# Patient Record
Sex: FEMALE | Race: OTHER | NOT HISPANIC OR LATINO | Employment: FULL TIME | URBAN - METROPOLITAN AREA
[De-identification: names, ages, dates, MRNs, and addresses within clinical notes are randomized per-mention and may not be internally consistent; named-entity substitution may affect disease eponyms.]

---

## 2017-09-14 ENCOUNTER — HOSPITAL ENCOUNTER (OUTPATIENT)
Dept: RADIOLOGY | Facility: MEDICAL CENTER | Age: 52
End: 2017-09-14

## 2017-09-19 ENCOUNTER — HOSPITAL ENCOUNTER (OUTPATIENT)
Dept: RADIOLOGY | Facility: MEDICAL CENTER | Age: 52
End: 2017-09-19
Attending: INTERNAL MEDICINE
Payer: COMMERCIAL

## 2017-09-19 DIAGNOSIS — Z12.31 VISIT FOR SCREENING MAMMOGRAM: ICD-10-CM

## 2017-09-19 PROCEDURE — G0202 SCR MAMMO BI INCL CAD: HCPCS

## 2017-09-20 ENCOUNTER — HOSPITAL ENCOUNTER (OUTPATIENT)
Facility: MEDICAL CENTER | Age: 52
End: 2017-09-20
Attending: INTERNAL MEDICINE
Payer: COMMERCIAL

## 2017-09-20 ENCOUNTER — OFFICE VISIT (OUTPATIENT)
Dept: INTERNAL MEDICINE | Facility: MEDICAL CENTER | Age: 52
End: 2017-09-20
Payer: COMMERCIAL

## 2017-09-20 ENCOUNTER — HOSPITAL ENCOUNTER (OUTPATIENT)
Dept: LAB | Facility: MEDICAL CENTER | Age: 52
End: 2017-09-20
Attending: INTERNAL MEDICINE
Payer: COMMERCIAL

## 2017-09-20 VITALS
SYSTOLIC BLOOD PRESSURE: 120 MMHG | HEIGHT: 58 IN | DIASTOLIC BLOOD PRESSURE: 76 MMHG | TEMPERATURE: 98.5 F | WEIGHT: 138 LBS | OXYGEN SATURATION: 97 % | BODY MASS INDEX: 28.97 KG/M2 | HEART RATE: 62 BPM

## 2017-09-20 DIAGNOSIS — E78.5 DYSLIPIDEMIA: ICD-10-CM

## 2017-09-20 DIAGNOSIS — L60.3 CRACKED NAILS: ICD-10-CM

## 2017-09-20 DIAGNOSIS — R42 VERTIGO: ICD-10-CM

## 2017-09-20 DIAGNOSIS — E55.9 VITAMIN D DEFICIENCY: ICD-10-CM

## 2017-09-20 DIAGNOSIS — Z12.11 SCREEN FOR COLON CANCER: ICD-10-CM

## 2017-09-20 DIAGNOSIS — Z12.4 PAP SMEAR FOR CERVICAL CANCER SCREENING: ICD-10-CM

## 2017-09-20 LAB
25(OH)D3 SERPL-MCNC: 46 NG/ML (ref 30–100)
ALBUMIN SERPL BCP-MCNC: 4 G/DL (ref 3.2–4.9)
ALBUMIN/GLOB SERPL: 1.3 G/DL
ALP SERPL-CCNC: 112 U/L (ref 30–99)
ALT SERPL-CCNC: 32 U/L (ref 2–50)
ANION GAP SERPL CALC-SCNC: 9 MMOL/L (ref 0–11.9)
AST SERPL-CCNC: 29 U/L (ref 12–45)
BASOPHILS # BLD AUTO: 0.6 % (ref 0–1.8)
BASOPHILS # BLD: 0.03 K/UL (ref 0–0.12)
BILIRUB SERPL-MCNC: 0.5 MG/DL (ref 0.1–1.5)
BUN SERPL-MCNC: 14 MG/DL (ref 8–22)
CALCIUM SERPL-MCNC: 9.4 MG/DL (ref 8.5–10.5)
CHLORIDE SERPL-SCNC: 104 MMOL/L (ref 96–112)
CHOLEST SERPL-MCNC: 262 MG/DL (ref 100–199)
CO2 SERPL-SCNC: 26 MMOL/L (ref 20–33)
CREAT SERPL-MCNC: 0.68 MG/DL (ref 0.5–1.4)
CYTOLOGY REG CYTOL: NORMAL
EOSINOPHIL # BLD AUTO: 0.09 K/UL (ref 0–0.51)
EOSINOPHIL NFR BLD: 1.7 % (ref 0–6.9)
ERYTHROCYTE [DISTWIDTH] IN BLOOD BY AUTOMATED COUNT: 42.3 FL (ref 35.9–50)
FERRITIN SERPL-MCNC: 28.1 NG/ML (ref 10–291)
GFR SERPL CREATININE-BSD FRML MDRD: >60 ML/MIN/1.73 M 2
GLOBULIN SER CALC-MCNC: 3 G/DL (ref 1.9–3.5)
GLUCOSE SERPL-MCNC: 99 MG/DL (ref 65–99)
HCT VFR BLD AUTO: 41.6 % (ref 37–47)
HDLC SERPL-MCNC: 74 MG/DL
HGB BLD-MCNC: 14 G/DL (ref 12–16)
IMM GRANULOCYTES # BLD AUTO: 0.01 K/UL (ref 0–0.11)
IMM GRANULOCYTES NFR BLD AUTO: 0.2 % (ref 0–0.9)
LDLC SERPL CALC-MCNC: 168 MG/DL
LYMPHOCYTES # BLD AUTO: 2.12 K/UL (ref 1–4.8)
LYMPHOCYTES NFR BLD: 39.2 % (ref 22–41)
MCH RBC QN AUTO: 31.3 PG (ref 27–33)
MCHC RBC AUTO-ENTMCNC: 33.7 G/DL (ref 33.6–35)
MCV RBC AUTO: 92.9 FL (ref 81.4–97.8)
MONOCYTES # BLD AUTO: 0.29 K/UL (ref 0–0.85)
MONOCYTES NFR BLD AUTO: 5.4 % (ref 0–13.4)
NEUTROPHILS # BLD AUTO: 2.87 K/UL (ref 2–7.15)
NEUTROPHILS NFR BLD: 52.9 % (ref 44–72)
NRBC # BLD AUTO: 0 K/UL
NRBC BLD AUTO-RTO: 0 /100 WBC
PLATELET # BLD AUTO: 254 K/UL (ref 164–446)
PMV BLD AUTO: 11.5 FL (ref 9–12.9)
POTASSIUM SERPL-SCNC: 4.5 MMOL/L (ref 3.6–5.5)
PROT SERPL-MCNC: 7 G/DL (ref 6–8.2)
RBC # BLD AUTO: 4.48 M/UL (ref 4.2–5.4)
SODIUM SERPL-SCNC: 139 MMOL/L (ref 135–145)
TRIGL SERPL-MCNC: 100 MG/DL (ref 0–149)
WBC # BLD AUTO: 5.4 K/UL (ref 4.8–10.8)

## 2017-09-20 PROCEDURE — 85025 COMPLETE CBC W/AUTO DIFF WBC: CPT

## 2017-09-20 PROCEDURE — 36415 COLL VENOUS BLD VENIPUNCTURE: CPT

## 2017-09-20 PROCEDURE — 80061 LIPID PANEL: CPT

## 2017-09-20 PROCEDURE — 82306 VITAMIN D 25 HYDROXY: CPT

## 2017-09-20 PROCEDURE — 80053 COMPREHEN METABOLIC PANEL: CPT

## 2017-09-20 PROCEDURE — 88175 CYTOPATH C/V AUTO FLUID REDO: CPT

## 2017-09-20 PROCEDURE — 82274 ASSAY TEST FOR BLOOD FECAL: CPT

## 2017-09-20 PROCEDURE — 99214 OFFICE O/P EST MOD 30 MIN: CPT | Mod: 25 | Performed by: INTERNAL MEDICINE

## 2017-09-20 PROCEDURE — 82728 ASSAY OF FERRITIN: CPT

## 2017-09-20 ASSESSMENT — PATIENT HEALTH QUESTIONNAIRE - PHQ9: CLINICAL INTERPRETATION OF PHQ2 SCORE: 0

## 2017-09-20 NOTE — PROGRESS NOTES
SUBJECTIVE: 52 y.o. female for annual routine gynecologic exam  Chief Complaint   Patient presents with   • Follow-Up     nails cracking   • Dizziness     x2 days when moves fast       Obstetric History     No data available      Last Pap: Several years ago  History   Sexual Activity   • Sexual activity: Yes   • Partners: Male   • Birth control/ protection: Post-Menopausal     Comment:      H/O Abnormal Pap unknown  She  reports that she is a non-smoker but has been exposed to tobacco smoke. She has never used smokeless tobacco.        Allergies: Review of patient's allergies indicates no known allergies.     ROS:    Menopause at age 43 .  Reports no menopause symptoms of hot flashes, night sweats, sleep disruption, mood changes, vaginal dryness.   No significant bloating/fluid retention, pelvic pain, or dyspareunia. No vaginal discharge   No breast tenderness, mass, nipple discharge, changes in size or contour, or abnormal cyclic discomfort.  No urinary tract symptoms, no incontinence.   No abdominal pain, change in bowel habits, black or bloody stools.    No unusual headaches, no visual changes, menstrual migraines   No prolonged cough. No dyspnea or chest pain on exertion.  No depression, labile mood, anxiety ,libido changes, insomnia.  No new/concerning skin lesions, concerns.     Exercise: moderate regular exercise program  Preventive Care:  Health Maintenance Topics with due status: Overdue       Topic Date Due    IMM DTaP/Tdap/Td Vaccine 03/08/1982    PAP SMEAR 03/08/1984    COLON CANCER SCREENING ANNUAL FIT 02/15/2017    IMM INFLUENZA 09/01/2017       Current medicines (including changes today)  Current Outpatient Prescriptions   Medication Sig Dispense Refill   • Multiple Vitamins-Calcium (ONE-A-DAY WOMENS PO) Take  by mouth.     • vitamin D (CHOLECALCIFEROL) 1000 UNIT Tab Take 1,000 Units by mouth every day.       No current facility-administered medications for this visit.      She  has a past  "medical history of Abnormal vaginal Pap smear; Dyslipidemia; Elevated alkaline phosphatase level; Fibrocystic breast disease; GERD (gastroesophageal reflux disease); Glucose intolerance (impaired glucose tolerance); Hair loss; Heart murmur; Pap smear, as part of routine gynecological examination; Preventative health care; Urge incontinence; and Vitamin D deficiency.  She  has no past surgical history on file.     Family History:   Family History   Problem Relation Age of Onset   • Cancer Sister      BREAST CANCER       Family History negative for :Aunt, dad's side had breast cancer in her mid 50s. Denies Colon, Lung, or other female organ cancer, thyroid disease, CAD, Diabetes, osteoporosis.     OBJECTIVE:   /76   Pulse 62   Temp 36.9 °C (98.5 °F)   Ht 1.473 m (4' 10\")   Wt 62.6 kg (138 lb)   SpO2 97%   BMI 28.84 kg/m²   Body mass index is 28.84 kg/m².    HEAD AND NECK:  Ears normal.  Throat, oral cavity and tongue normal.  Neck supple. No adenopathy or masses in the neck or supraclavicular regions.  No carotid bruits. No thyromegaly. NEURO: Cranial nerves are normal. DTR's normal and symmetric.  CHEST:  Clear, good air entry, no wheezes or rales. HEART:  Regular rate and rhythm.  S1 and S2 normal.   No edema or JVD. ABDOMEN:  Soft without tenderness, guarding, mass or organomegaly.  No CVA tenderness or inguinal adenopathy. EXTREMITIES:  Extremities, reflexes and peripheral pulses are normal. SKIN: color normal, vascularity normal, no edema, temperature normal   No rashes or suspicious skin lesions noted.     Breast Exam: Performed with instruction during examination. No axillary lymphadenopathy, no skin changes, no dominant masses. No nipple retraction  Pelvic Exam -  Normal external genitalia with no lesions. Normal vaginal mucosa with normal rugation and scant discharge. Cervix with no visible lesions. No cervical motion tenderness. Uterus is normal sized with no masses. No adnexal tenderness or " enlargement appreciated. Sure Path Pap is obtained, vaginal swab is not obtained and specimen(s) sent to lab  Rectal: deferred    <ASSESSMENT and PLAN>  1. Vitamin D deficiency  VITAMIN D,25 HYDROXY   2. Dyslipidemia  COMP METABOLIC PANEL    LIPID PROFILE   3. Cracked nails  FERRITIN   4. Vertigo  CBC WITH DIFFERENTIAL    FERRITIN   5. Screen for colon cancer  OCCULT BLOOD FECES IMMUNOASSAY (FIT)   6. Pap smear for cervical cancer screening  THINPREP PAP, REFLEX HPV ON ASC-US AND ABOVE     Patient has complained of some cracked nails as well as dizziness issues. We discussed about physical therapy and refused that for now as she can keep it under control and she most slowly in terms of her head slowly. For cracked nails she has been using Biotene for skin hair and nails. We discussed about getting ferritin level. At prior history of vitamin D deficiency and high cholesterol we'll order labs and follow-up.    Discussed  diet and exercise   Follow-up in 1 years for next Gyn exam and 3 years for next Pap.   Next office visit for recheck of chronic medical conditions is due in  1 year

## 2017-09-20 NOTE — PATIENT INSTRUCTIONS
Dyslipidemia  Dyslipidemia is an imbalance of the lipids in your blood. Lipids are waxy, fat-like proteins that your body needs in small amounts. Dyslipidemia often involves the lipids cholesterol or triglycerides. Common forms of dyslipidemia are:  · High levels of bad cholesterol (LDL cholesterol). LDL cholesterol is the type of cholesterol that causes heart disease.  · Low levels of good cholesterol (HDL cholesterol). HDL cholesterol is the type of cholesterol that helps protect against heart disease.  · High levels of triglycerides. Triglycerides are a fatty substance in the blood linked to a buildup of plaque on your arteries.  RISK FACTORS  · Increased age.  · Having a family history of high cholesterol.  · Certain medicines, including birth control pills, diuretics, beta-blockers, and some medicines for depression.  · Smoking.  · Eating a high-fat diet.  · Being overweight.  · Medical conditions such as diabetes, polycystic ovary syndrome, pregnancy, kidney disease, and hypothyroidism.  · Lack of regular exercise.  SIGNS AND SYMPTOMS  There are no signs or symptoms with dyslipidemia.  DIAGNOSIS  A simple blood test called a fasting blood test can be done to determine your level of:  · Total cholesterol. This is the combined number of LDL cholesterol and HDL cholesterol. A healthy number is lower than 200.  · LDL cholesterol. The goal number for LDL cholesterol is different for each person depending on risk factors. Ask your health care provider what your LDL cholesterol number should be.  · HDL cholesterol. A healthy level of HDL cholesterol is 60 or higher. A number lower than 40 for men or 50 for women is a danger sign.  · Triglycerides. A healthy triglyceride number is less than 150.  TREATMENT  Dyslipidemia is a treatable condition. Your health care provider will advise you on what type of treatment is best based on your age, your test results, and current guidelines. Treatment may include:  · Dietary  changes. A dietitian may help you create a diet that is based on your risk factors, conditions, and lifestyle.  · Regular exercise. This can help lower your LDL cholesterol, raise your HDL cholesterol, and help with weight management. Check with your health care provider before beginning an exercise program. Most people should participate in 30 minutes of brisk exercise 5 days a week.  · Quitting smoking.  · Medicines to lower LDL cholesterol and triglycerides.  · If you have high levels of triglycerides, your health care provider may:  ¨ Have you stop drinking alcohol.  ¨ Have you restrict your fat intake.  ¨ Have you eliminate refined sugars from your diet.  ¨ Treat you for other conditions, such as underactive thyroid gland (hypothyroidism) and high blood sugar (hyperglycemia).  Your health care provider will monitor your lipid levels with regular blood tests.  HOME CARE INSTRUCTIONS  · Eat a healthy diet. Follow any diet instructions if they were given to you by your health care provider.  · Maintain a healthy weight.  · Exercise regularly based on the recommendations of your health care provider.  · Do not use any tobacco products, including cigarettes, chewing tobacco, or electronic cigarettes.  · Take medicines only as directed by your health care provider.  · Keep all follow-up visits as directed by your health care provider.  SEEK MEDICAL CARE IF:  You are having possible side effects from your medicines.     This information is not intended to replace advice given to you by your health care provider. Make sure you discuss any questions you have with your health care provider.     Document Released: 12/23/2014 Document Revised: 01/08/2016 Document Reviewed: 12/23/2014  [employer] Interactive Patient Education ©2016 [employer]

## 2017-09-21 DIAGNOSIS — Z12.11 SCREEN FOR COLON CANCER: ICD-10-CM

## 2017-09-22 ENCOUNTER — TELEPHONE (OUTPATIENT)
Dept: INTERNAL MEDICINE | Facility: MEDICAL CENTER | Age: 52
End: 2017-09-22

## 2017-09-22 DIAGNOSIS — R19.5 OCCULT BLOOD POSITIVE STOOL: ICD-10-CM

## 2017-09-22 LAB — HEMOCCULT STL QL IA: POSITIVE

## 2017-09-22 NOTE — TELEPHONE ENCOUNTER
----- Message from Keith Escobedo Ass't sent at 9/22/2017  3:03 PM PDT -----  Spoke with pt and pt  to let her know the test came back positive and she is ok with doing the colonoscopy to do the colon cancer screening.

## 2018-02-07 ENCOUNTER — TELEPHONE (OUTPATIENT)
Dept: INTERNAL MEDICINE | Facility: MEDICAL CENTER | Age: 53
End: 2018-02-07

## 2018-02-07 NOTE — TELEPHONE ENCOUNTER
Blanca Rodríguez M.D.; Keith Andersen Ass't   Cc: Eder Steinberg; Keith Valverde Ass't   Phone Number: [phone]             Patient states that she never got a call about her colonoscopy result she states Dr Rodríguez was suppose to give her the results.

## 2018-02-08 NOTE — TELEPHONE ENCOUNTER
Please let patient know there is no colon cancer. The gastroenterologist should have sent a report to her.

## 2019-03-20 ENCOUNTER — HOSPITAL ENCOUNTER (OUTPATIENT)
Dept: LAB | Facility: MEDICAL CENTER | Age: 54
End: 2019-03-20
Attending: INTERNAL MEDICINE
Payer: COMMERCIAL

## 2019-03-20 ENCOUNTER — TELEPHONE (OUTPATIENT)
Dept: INTERNAL MEDICINE | Facility: MEDICAL CENTER | Age: 54
End: 2019-03-20

## 2019-03-20 ENCOUNTER — OFFICE VISIT (OUTPATIENT)
Dept: INTERNAL MEDICINE | Facility: MEDICAL CENTER | Age: 54
End: 2019-03-20
Payer: COMMERCIAL

## 2019-03-20 VITALS
BODY MASS INDEX: 30.42 KG/M2 | HEART RATE: 64 BPM | OXYGEN SATURATION: 100 % | HEIGHT: 57 IN | SYSTOLIC BLOOD PRESSURE: 138 MMHG | DIASTOLIC BLOOD PRESSURE: 70 MMHG | TEMPERATURE: 97.6 F | WEIGHT: 141 LBS

## 2019-03-20 DIAGNOSIS — L60.3 CRACKED NAILS: ICD-10-CM

## 2019-03-20 DIAGNOSIS — E55.9 VITAMIN D DEFICIENCY: ICD-10-CM

## 2019-03-20 DIAGNOSIS — Z12.39 SCREENING FOR BREAST CANCER: ICD-10-CM

## 2019-03-20 DIAGNOSIS — G62.9 PERIPHERAL POLYNEUROPATHY: ICD-10-CM

## 2019-03-20 DIAGNOSIS — E66.9 OBESITY (BMI 30-39.9): ICD-10-CM

## 2019-03-20 DIAGNOSIS — E78.5 DYSLIPIDEMIA: ICD-10-CM

## 2019-03-20 DIAGNOSIS — Z00.00 ANNUAL PHYSICAL EXAM: ICD-10-CM

## 2019-03-20 DIAGNOSIS — Z23 NEED FOR TDAP VACCINATION: ICD-10-CM

## 2019-03-20 LAB
25(OH)D3 SERPL-MCNC: 45 NG/ML (ref 30–100)
ALBUMIN SERPL BCP-MCNC: 4 G/DL (ref 3.2–4.9)
ALBUMIN/GLOB SERPL: 1.4 G/DL
ALP SERPL-CCNC: 86 U/L (ref 30–99)
ALT SERPL-CCNC: 28 U/L (ref 2–50)
ANION GAP SERPL CALC-SCNC: 5 MMOL/L (ref 0–11.9)
AST SERPL-CCNC: 31 U/L (ref 12–45)
BASOPHILS # BLD AUTO: 0 % (ref 0–1.8)
BASOPHILS # BLD: 0 K/UL (ref 0–0.12)
BILIRUB SERPL-MCNC: 0.3 MG/DL (ref 0.1–1.5)
BUN SERPL-MCNC: 17 MG/DL (ref 8–22)
CALCIUM SERPL-MCNC: 9.2 MG/DL (ref 8.5–10.5)
CHLORIDE SERPL-SCNC: 106 MMOL/L (ref 96–112)
CHOLEST SERPL-MCNC: 202 MG/DL (ref 100–199)
CO2 SERPL-SCNC: 28 MMOL/L (ref 20–33)
CREAT SERPL-MCNC: 0.65 MG/DL (ref 0.5–1.4)
EOSINOPHIL # BLD AUTO: 0.13 K/UL (ref 0–0.51)
EOSINOPHIL NFR BLD: 2.7 % (ref 0–6.9)
ERYTHROCYTE [DISTWIDTH] IN BLOOD BY AUTOMATED COUNT: 42 FL (ref 35.9–50)
FERRITIN SERPL-MCNC: 43.1 NG/ML (ref 10–291)
GLOBULIN SER CALC-MCNC: 2.9 G/DL (ref 1.9–3.5)
GLUCOSE SERPL-MCNC: 89 MG/DL (ref 65–99)
HCT VFR BLD AUTO: 42.2 % (ref 37–47)
HDLC SERPL-MCNC: 63 MG/DL
HGB BLD-MCNC: 14 G/DL (ref 12–16)
LDLC SERPL CALC-MCNC: 121 MG/DL
LYMPHOCYTES # BLD AUTO: 1.78 K/UL (ref 1–4.8)
LYMPHOCYTES NFR BLD: 36.4 % (ref 22–41)
MANUAL DIFF BLD: NORMAL
MCH RBC QN AUTO: 31 PG (ref 27–33)
MCHC RBC AUTO-ENTMCNC: 33.2 G/DL (ref 33.6–35)
MCV RBC AUTO: 93.6 FL (ref 81.4–97.8)
MONOCYTES # BLD AUTO: 0.22 K/UL (ref 0–0.85)
MONOCYTES NFR BLD AUTO: 4.5 % (ref 0–13.4)
MORPHOLOGY BLD-IMP: NORMAL
NEUTROPHILS # BLD AUTO: 2.76 K/UL (ref 2–7.15)
NEUTROPHILS NFR BLD: 54.6 % (ref 44–72)
NEUTS BAND NFR BLD MANUAL: 1.8 % (ref 0–10)
NRBC # BLD AUTO: 0 K/UL
NRBC BLD-RTO: 0 /100 WBC
PLATELET # BLD AUTO: 245 K/UL (ref 164–446)
PLATELET BLD QL SMEAR: NORMAL
PMV BLD AUTO: 11.4 FL (ref 9–12.9)
POTASSIUM SERPL-SCNC: 4.3 MMOL/L (ref 3.6–5.5)
PROT SERPL-MCNC: 6.9 G/DL (ref 6–8.2)
RBC # BLD AUTO: 4.51 M/UL (ref 4.2–5.4)
RBC BLD AUTO: NORMAL
SODIUM SERPL-SCNC: 139 MMOL/L (ref 135–145)
TRIGL SERPL-MCNC: 91 MG/DL (ref 0–149)
TSH SERPL DL<=0.005 MIU/L-ACNC: 1.85 UIU/ML (ref 0.38–5.33)
VIT B12 SERPL-MCNC: >1500 PG/ML (ref 211–911)
WBC # BLD AUTO: 4.9 K/UL (ref 4.8–10.8)

## 2019-03-20 PROCEDURE — 80053 COMPREHEN METABOLIC PANEL: CPT

## 2019-03-20 PROCEDURE — 85007 BL SMEAR W/DIFF WBC COUNT: CPT

## 2019-03-20 PROCEDURE — 80061 LIPID PANEL: CPT

## 2019-03-20 PROCEDURE — 36415 COLL VENOUS BLD VENIPUNCTURE: CPT

## 2019-03-20 PROCEDURE — 90471 IMMUNIZATION ADMIN: CPT | Performed by: INTERNAL MEDICINE

## 2019-03-20 PROCEDURE — 84443 ASSAY THYROID STIM HORMONE: CPT

## 2019-03-20 PROCEDURE — 82728 ASSAY OF FERRITIN: CPT

## 2019-03-20 PROCEDURE — 82306 VITAMIN D 25 HYDROXY: CPT

## 2019-03-20 PROCEDURE — 85027 COMPLETE CBC AUTOMATED: CPT

## 2019-03-20 PROCEDURE — 90715 TDAP VACCINE 7 YRS/> IM: CPT | Performed by: INTERNAL MEDICINE

## 2019-03-20 PROCEDURE — 82607 VITAMIN B-12: CPT

## 2019-03-20 PROCEDURE — 99396 PREV VISIT EST AGE 40-64: CPT | Mod: 25 | Performed by: INTERNAL MEDICINE

## 2019-03-20 ASSESSMENT — PATIENT HEALTH QUESTIONNAIRE - PHQ9: CLINICAL INTERPRETATION OF PHQ2 SCORE: 0

## 2019-03-20 NOTE — TELEPHONE ENCOUNTER
Pt came back to the clinic stating that she forgot to mention to PCP that she has been having numbness on both of her feet, every time she walks she feels like a vibration feeling almost like if she has been sitting down for while to where her leg falls asleep. She states its not a sharp feeling and has been going on for 3 mths, would like to know what she can do about this. No swelling or discoloration of legs. nainly numbness in on the feet.

## 2019-03-20 NOTE — NON-PROVIDER
"Valeria Feliciano is a 54 y.o. female here for a non-provider visit for:   TDAP    Reason for immunization: Overdue/Provider Recommended  Immunization records indicate need for vaccine: Yes, confirmed with Epic  Minimum interval has been met for this vaccine: Yes  ABN completed: Not Indicated    Order and dose verified by: JOSEMANUEL  VIS Dated  02/24/15 was given to patient: Yes  All IAC Questionnaire questions were answered \"No.\"    Patient tolerated injection and no adverse effects were observed or reported: Yes    Pt scheduled for next dose in series: Not Indicated  "

## 2019-03-20 NOTE — TELEPHONE ENCOUNTER
Please call lab to see if we can add vitamin B12 level given her symptoms to the lab draw this morning

## 2019-03-20 NOTE — PATIENT INSTRUCTIONS
Dyslipidemia  Dyslipidemia is an imbalance of waxy, fat-like substances (lipids) in the blood. The body needs lipids in small amounts. Dyslipidemia often involves a high level of cholesterol or triglycerides, which are types of lipids.  Common forms of dyslipidemia include:  · High levels of bad cholesterol (LDL cholesterol). LDL is the type of cholesterol that causes fatty deposits (plaques) to build up in the blood vessels that carry blood away from your heart (arteries).  · Low levels of good cholesterol (HDL cholesterol). HDL cholesterol is the type of cholesterol that protects against heart disease. High levels of HDL remove the LDL buildup from arteries.  · High levels of triglycerides. Triglycerides are a fatty substance in the blood that is linked to a buildup of plaques in the arteries.  You can develop dyslipidemia because of the genes you are born with (primary dyslipidemia) or changes that occur during your life (secondary dyslipidemia), or as a side effect of certain medical treatments.  What are the causes?  Primary dyslipidemia is caused by changes (mutations) in genes that are passed down through families (inherited). These mutations cause several types of dyslipidemia. Mutations can result in disorders that make the body produce too much LDL cholesterol or triglycerides, or not enough HDL cholesterol. These disorders may lead to heart disease, arterial disease, or stroke at an early age.  Causes of secondary dyslipidemia include certain lifestyle choices and diseases that lead to dyslipidemia, such as:  · Eating a diet that is high in animal fat.  · Not getting enough activity or exercise (having a sedentary lifestyle).  · Having diabetes, kidney disease, liver disease, or thyroid disease.  · Drinking large amounts of alcohol.  · Using certain types of drugs.  What increases the risk?  You may be at greater risk for dyslipidemia if you are an older man or if you are a woman who has gone through  menopause. Other risk factors include:  · Having a family history of dyslipidemia.  · Taking certain medicines, including birth control pills, steroids, some diuretics, beta-blockers, and some medicines for HIV.  · Smoking cigarettes.  · Eating a high-fat diet.  · Drinking large amounts of alcohol.  · Having certain medical conditions such as diabetes, polycystic ovary syndrome (PCOS), pregnancy, kidney disease, liver disease, or hypothyroidism.  · Not exercising regularly.  · Being overweight or obese with too much belly fat.  What are the signs or symptoms?  Dyslipidemia does not usually cause any symptoms.  Very high lipid levels can cause fatty bumps under the skin (xanthomas) or a white or gray ring around the black center (pupil) of the eye. Very high triglyceride levels can cause inflammation of the pancreas (pancreatitis).  How is this diagnosed?  Your health care provider may diagnose dyslipidemia based on a routine blood test (fasting blood test). Because most people do not have symptoms of the condition, this blood testing (lipid profile) is done on adults age 20 and older and is repeated every 5 years. This test checks:  · Total cholesterol. This is a measure of the total amount of cholesterol in your blood, including LDL cholesterol, HDL cholesterol, and triglycerides. A healthy number is below 200.  · LDL cholesterol. The target number for LDL cholesterol is different for each person, depending on individual risk factors. For most people, a number below 100 is healthy. Ask your health care provider what your LDL cholesterol number should be.  · HDL cholesterol. An HDL level of 60 or higher is best because it helps to protect against heart disease. A number below 40 for men or below 50 for women increases the risk for heart disease.  · Triglycerides. A healthy triglyceride number is below 150.  If your lipid profile is abnormal, your health care provider may do other blood tests to get more information  about your condition.  How is this treated?  Treatment depends on the type of dyslipidemia that you have and your other risk factors for heart disease and stroke. Your health care provider will have a target range for your lipid levels based on this information.  For many people, treatment starts with lifestyle changes, such as diet and exercise. Your health care provider may recommend that you:  · Get regular exercise.  · Make changes to your diet.  · Quit smoking if you smoke.  If diet changes and exercise do not help you reach your goals, your health care provider may also prescribe medicine to lower lipids. The most commonly prescribed type of medicine lowers your LDL cholesterol (statin drug). If you have a high triglyceride level, your provider may prescribe another type of drug (fibrate) or an omega-3 fish oil supplement, or both.  Follow these instructions at home:  · Take over-the-counter and prescription medicines only as told by your health care provider. This includes supplements.  · Get regular exercise. Start an aerobic exercise and strength training program as told by your health care provider. Ask your health care provider what activities are safe for you. Your health care provider may recommend:  ¨ 30 minutes of aerobic activity 4-6 days a week. Brisk walking is an example of aerobic activity.  ¨ Strength training 2 days a week.  · Eat a healthy diet as told by your health care provider. This can help you reach and maintain a healthy weight, lower your LDL cholesterol, and raise your HDL cholesterol. It may help to work with a diet and nutrition specialist (dietitian) to make a plan that is right for you. Your dietitian or health care provider may recommend:  ¨ Limiting your calories, if you are overweight.  ¨ Eating more fruits, vegetables, whole grains, fish, and lean meats.  ¨ Limiting saturated fat, trans fat, and cholesterol.  · Follow instructions from your health care provider or dietitian  about eating or drinking restrictions.  · Limit alcohol intake to no more than one drink per day for nonpregnant women and two drinks per day for men. One drink equals 12 oz of beer, 5 oz of wine, or 1½ oz of hard liquor.  · Do not use any products that contain nicotine or tobacco, such as cigarettes and e-cigarettes. If you need help quitting, ask your health care provider.  · Keep all follow-up visits as told by your health care provider. This is important.  Contact a health care provider if:  · You are having trouble sticking to your exercise or diet plan.  · You are struggling to quit smoking or control your use of alcohol.  Summary  · Dyslipidemia is an imbalance of waxy, fat-like substances (lipids) in the blood. The body needs lipids in small amounts. Dyslipidemia often involves a high level of cholesterol or triglycerides, which are types of lipids.  · Treatment depends on the type of dyslipidemia that you have and your other risk factors for heart disease and stroke.  · For many people, treatment starts with lifestyle changes, such as diet and exercise. Your health care provider may also prescribe medicine to lower lipids.  This information is not intended to replace advice given to you by your health care provider. Make sure you discuss any questions you have with your health care provider.  Document Released: 12/23/2014 Document Revised: 08/14/2017 Document Reviewed: 08/14/2017  [employer] Interactive Patient Education © 2017 [employer]

## 2019-03-22 NOTE — PROGRESS NOTES
date  Chief Complaint   Patient presents with   • Annual Exam       HISTORY OF PRESENT ILLNESS: Patient is a 54 y.o. female established patient who presents today for annual checkup    1. Annual physical exam  Patient states she is doing well overall and staying active.  Continues to work in a casino and had last Pap smear done in September 2017 which was within normal.    2. Obesity (BMI 30-39.9)  Recently noticed a few pounds weight gain and states she is trying to stay on top of her diet and exercise    3. Dyslipidemia  Prior history of elevated cholesterol and never been on medication.  Trying to watch diet and exercise    4. Cracked nails  Complaint of pigmentation and cracked nails and has been taking Biotene for skin hair and nails without much impact.  Denies having.'s ever since she reached menopause.    5. Vitamin D deficiency  Taking vitamin D supplements occasionally    6. Screening for breast cancer  Due for mammogram and denies having any lumps or cysts in her breasts    7. Need for Tdap vaccination  Patient due for Tdap vaccine and interested in getting one      Past Medical History:   Diagnosis Date   • Abnormal vaginal Pap smear    • Dyslipidemia    • Elevated alkaline phosphatase level    • Fibrocystic breast disease    • GERD (gastroesophageal reflux disease)    • Glucose intolerance (impaired glucose tolerance)    • Hair loss    • Heart murmur    • Pap smear, as part of routine gynecological examination    • Preventative health care    • Urge incontinence    • Vitamin D deficiency        Patient Active Problem List    Diagnosis Date Noted   • Obesity (BMI 30-39.9) 03/20/2019   • Vitamin D deficiency 09/20/2017   • Dyslipidemia 09/20/2017   • Cracked nails 09/20/2017       Allergies:Patient has no known allergies.    Current Outpatient Prescriptions   Medication Sig Dispense Refill   • Multiple Vitamins-Calcium (ONE-A-DAY WOMENS PO) Take  by mouth.     • vitamin D (CHOLECALCIFEROL) 1000 UNIT Tab  "Take 1,000 Units by mouth every day.       No current facility-administered medications for this visit.        Social History   Substance Use Topics   • Smoking status: Passive Smoke Exposure - Never Smoker   • Smokeless tobacco: Never Used   • Alcohol use No       Family History   Problem Relation Age of Onset   • Cancer Sister         BREAST CANCER         Review of Systems   Patient denies Fevers/chills/nausea/vomiting/chest pain/sob/blood in stools/black stools/blood in urine.all other systems are reviewed See HPI    Exam:  Blood pressure 138/70, pulse 64, temperature 36.4 °C (97.6 °F), temperature source Temporal, height 1.46 m (4' 9.48\"), weight 64 kg (141 lb), SpO2 100 %. Body mass index is 30 kg/m².  Constitutional:  NAD, well appearing.  HEENT:   NC/AT  Cardiovascular: RRR.   No m/r/g. No carotid bruits.       Lungs:   CTAB, no w/r/r, no respiratory distress.  Abdomen: Soft, NT/ND + BS, no masses, no suprapubic tenderness, no hepatomegaly.  Extremities:  2+ DP and radial pulses bilaterally.  No c/c/e.  Skin:  Warm and dry.    Neurologic: Alert & oriented x 3, CN II-XII grossly intact, strength and sensation grossly intact.  No focal deficits noted.  Psychiatric:  Affect normal, mood normal, judgment normal.    Assessment/Plan:     1. Annual physical exam  Patient had completed Pap smears September 2017 and not due until 2020.  Continue eating healthy exercise and stay active    2. Obesity (BMI 30-39.9)  Discussed about diet and exercise in length again  - Patient identified as having weight management issue.  Appropriate orders and counseling given.    3. Dyslipidemia  Her ASCVD risk score was low in the past but will repeat lipid profile and continue diet and exercise.  - Lipid Profile; Future  - Comp Metabolic Panel; Future    4. Cracked nails  Given her chronic meals we will check iron levels as well as CBC and thyroid function  - CBC WITH DIFFERENTIAL; Future  - FERRITIN; Future  - TSH; Future    5. " Vitamin D deficiency  Continue vitamin D supplement and check level  - VITAMIN D,25 HYDROXY; Future    6. Screening for breast cancer  Patient advised to get mammograms yearly and order given  - MA-SCREENING MAMMO BILAT W/TOMOSYNTHESIS W/CAD; Future    7. Need for Tdap vaccination  Tdap vaccine today in the office  - Tdap =>6yo IM      All imaging results and lab results and consult notes are reviewed at this visit.  Followup: Return in about 1 year (around 3/20/2020).    Please note that this dictation was created using voice recognition software. I have made every reasonable attempt to correct obvious errors, but I expect that there are errors of grammar and possibly content that I did not discover before finalizing the note.

## 2019-04-03 ENCOUNTER — HOSPITAL ENCOUNTER (OUTPATIENT)
Dept: RADIOLOGY | Facility: MEDICAL CENTER | Age: 54
End: 2019-04-03
Attending: INTERNAL MEDICINE
Payer: COMMERCIAL

## 2019-04-03 DIAGNOSIS — Z12.39 SCREENING FOR BREAST CANCER: ICD-10-CM

## 2019-04-03 PROCEDURE — 77063 BREAST TOMOSYNTHESIS BI: CPT

## 2020-05-21 ENCOUNTER — HOSPITAL ENCOUNTER (OUTPATIENT)
Facility: MEDICAL CENTER | Age: 55
End: 2020-05-21
Payer: COMMERCIAL

## 2020-05-27 LAB
SARS-COV-2 RNA SPEC QL NAA+PROBE: NOT DETECTED
SPECIMEN SOURCE: NORMAL

## 2020-07-28 ENCOUNTER — HOSPITAL ENCOUNTER (OUTPATIENT)
Dept: RADIOLOGY | Facility: MEDICAL CENTER | Age: 55
End: 2020-07-28
Attending: FAMILY MEDICINE
Payer: COMMERCIAL

## 2020-07-28 ENCOUNTER — OFFICE VISIT (OUTPATIENT)
Dept: URGENT CARE | Facility: PHYSICIAN GROUP | Age: 55
End: 2020-07-28
Payer: COMMERCIAL

## 2020-07-28 VITALS
TEMPERATURE: 98.1 F | RESPIRATION RATE: 14 BRPM | WEIGHT: 141 LBS | HEART RATE: 72 BPM | HEIGHT: 60 IN | BODY MASS INDEX: 27.68 KG/M2 | SYSTOLIC BLOOD PRESSURE: 110 MMHG | DIASTOLIC BLOOD PRESSURE: 60 MMHG | OXYGEN SATURATION: 99 %

## 2020-07-28 DIAGNOSIS — S43.402A SPRAIN OF LEFT SHOULDER, UNSPECIFIED SHOULDER SPRAIN TYPE, INITIAL ENCOUNTER: ICD-10-CM
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
## 2020-07-28 PROCEDURE — 99203 OFFICE O/P NEW LOW 30 MIN: CPT | Performed by: FAMILY MEDICINE

## 2020-07-28 PROCEDURE — 73030 X-RAY EXAM OF SHOULDER: CPT | Mod: LT

## 2020-07-28 ASSESSMENT — FIBROSIS 4 INDEX: FIB4 SCORE: 1.36

## 2020-07-28 ASSESSMENT — ENCOUNTER SYMPTOMS: FALLS: 1

## 2020-07-28 NOTE — PROGRESS NOTES
Subjective:      Valeria Feliciano is a 55 y.o. female who presents with Shoulder Pain (fell on L shoulder last night )      - This is a pleasant and non toxic appearing 55 y.o. female with c/o slip/fall yesterday whilst walking and hurt Lt shoulder. No head trauma/LOC. Pain worse movement, better rest            ALLERGIES:  Patient has no known allergies.     PMH:  Past Medical History:   Diagnosis Date   • Abnormal vaginal Pap smear    • Dyslipidemia    • Elevated alkaline phosphatase level    • Fibrocystic breast disease    • GERD (gastroesophageal reflux disease)    • Glucose intolerance (impaired glucose tolerance)    • Hair loss    • Heart murmur    • Pap smear, as part of routine gynecological examination    • Preventative health care    • Urge incontinence    • Vitamin D deficiency         PSH:  History reviewed. No pertinent surgical history.    MEDS:    Current Outpatient Medications:   •  Multiple Vitamins-Calcium (ONE-A-DAY WOMENS PO), Take  by mouth., Disp: , Rfl:   •  vitamin D (CHOLECALCIFEROL) 1000 UNIT Tab, Take 1,000 Units by mouth every day., Disp: , Rfl:     ** I have documented what I find to be significant in regards to past medical, social, family and surgical history  in my HPI or under PMH/PSH/FH review section, otherwise it is contributory **           HPI    Review of Systems   Musculoskeletal: Positive for falls and joint pain.   All other systems reviewed and are negative.         Objective:     /60   Pulse 72   Temp 36.7 °C (98.1 °F) (Temporal)   Resp 14   Ht 1.524 m (5')   Wt 64 kg (141 lb)   SpO2 99%   BMI 27.54 kg/m²      Physical Exam  Vitals signs and nursing note reviewed.   Constitutional:       General: She is not in acute distress.     Appearance: She is well-developed. She is not diaphoretic.   HENT:      Head: Normocephalic and atraumatic.   Eyes:      General: No scleral icterus.     Conjunctiva/sclera: Conjunctivae normal.   Cardiovascular:      Heart sounds: Normal  heart sounds. No murmur.   Pulmonary:      Effort: Pulmonary effort is normal. No respiratory distress.   Musculoskeletal: Normal range of motion.         General: Tenderness and signs of injury present. No swelling or deformity.        Arms:    Skin:     Coloration: Skin is not pale.      Findings: No rash.   Neurological:      Mental Status: She is alert.      Motor: No abnormal muscle tone.   Psychiatric:         Mood and Affect: Mood normal.         Behavior: Behavior normal.         Judgment: Judgment normal.                 Assessment/Plan:           1. Sprain of left shoulder, unspecified shoulder sprain type, initial encounter  DX-SHOULDER 2+ LEFT   2. Fall from ground level  DX-SHOULDER 2+ LEFT     Xray: no acute findings by MY READ. RADIOLOGY READ PENDING.       - rest/OTC Aleve  - gentle ROM stretching  - E.R. precautions discussed     Dx & d/c instructions discussed w/ patient and/or family members.     Follow up with PCP (or UC if PCP is unavailable) in 7 days to make sure improving and no further additional treatment needed, ER if not improving or feeling/getting worse.

## 2020-09-15 ENCOUNTER — HOSPITAL ENCOUNTER (OUTPATIENT)
Dept: RADIOLOGY | Facility: MEDICAL CENTER | Age: 55
End: 2020-09-15
Attending: INTERNAL MEDICINE
Payer: COMMERCIAL

## 2020-09-15 DIAGNOSIS — Z12.31 VISIT FOR SCREENING MAMMOGRAM: ICD-10-CM

## 2020-09-15 PROCEDURE — 77067 SCR MAMMO BI INCL CAD: CPT

## 2020-09-22 ENCOUNTER — HOSPITAL ENCOUNTER (OUTPATIENT)
Dept: LAB | Facility: MEDICAL CENTER | Age: 55
End: 2020-09-22
Attending: INTERNAL MEDICINE
Payer: COMMERCIAL

## 2020-09-22 LAB
ALBUMIN SERPL BCP-MCNC: 4.1 G/DL (ref 3.2–4.9)
ALBUMIN/GLOB SERPL: 1.4 G/DL
ALP SERPL-CCNC: 114 U/L (ref 30–99)
ALT SERPL-CCNC: 28 U/L (ref 2–50)
ANION GAP SERPL CALC-SCNC: 11 MMOL/L (ref 7–16)
AST SERPL-CCNC: 32 U/L (ref 12–45)
BASOPHILS # BLD AUTO: 0.4 % (ref 0–1.8)
BASOPHILS # BLD: 0.02 K/UL (ref 0–0.12)
BILIRUB SERPL-MCNC: 0.4 MG/DL (ref 0.1–1.5)
BUN SERPL-MCNC: 20 MG/DL (ref 8–22)
CALCIUM SERPL-MCNC: 9.6 MG/DL (ref 8.5–10.5)
CHLORIDE SERPL-SCNC: 101 MMOL/L (ref 96–112)
CHOLEST SERPL-MCNC: 223 MG/DL (ref 100–199)
CO2 SERPL-SCNC: 25 MMOL/L (ref 20–33)
CREAT SERPL-MCNC: 0.68 MG/DL (ref 0.5–1.4)
EOSINOPHIL # BLD AUTO: 0.05 K/UL (ref 0–0.51)
EOSINOPHIL NFR BLD: 1.1 % (ref 0–6.9)
ERYTHROCYTE [DISTWIDTH] IN BLOOD BY AUTOMATED COUNT: 41.8 FL (ref 35.9–50)
EST. AVERAGE GLUCOSE BLD GHB EST-MCNC: 117 MG/DL
FASTING STATUS PATIENT QL REPORTED: NORMAL
GLOBULIN SER CALC-MCNC: 3 G/DL (ref 1.9–3.5)
GLUCOSE SERPL-MCNC: 90 MG/DL (ref 65–99)
HBA1C MFR BLD: 5.7 % (ref 0–5.6)
HCT VFR BLD AUTO: 40.8 % (ref 37–47)
HDLC SERPL-MCNC: 66 MG/DL
HGB BLD-MCNC: 13.6 G/DL (ref 12–16)
IMM GRANULOCYTES # BLD AUTO: 0.01 K/UL (ref 0–0.11)
IMM GRANULOCYTES NFR BLD AUTO: 0.2 % (ref 0–0.9)
LDLC SERPL CALC-MCNC: 143 MG/DL
LYMPHOCYTES # BLD AUTO: 1.7 K/UL (ref 1–4.8)
LYMPHOCYTES NFR BLD: 37.3 % (ref 22–41)
MCH RBC QN AUTO: 31 PG (ref 27–33)
MCHC RBC AUTO-ENTMCNC: 33.3 G/DL (ref 33.6–35)
MCV RBC AUTO: 92.9 FL (ref 81.4–97.8)
MONOCYTES # BLD AUTO: 0.22 K/UL (ref 0–0.85)
MONOCYTES NFR BLD AUTO: 4.8 % (ref 0–13.4)
NEUTROPHILS # BLD AUTO: 2.56 K/UL (ref 2–7.15)
NEUTROPHILS NFR BLD: 56.2 % (ref 44–72)
NRBC # BLD AUTO: 0 K/UL
NRBC BLD-RTO: 0 /100 WBC
PLATELET # BLD AUTO: 218 K/UL (ref 164–446)
PMV BLD AUTO: 12 FL (ref 9–12.9)
POTASSIUM SERPL-SCNC: 4.4 MMOL/L (ref 3.6–5.5)
PROT SERPL-MCNC: 7.1 G/DL (ref 6–8.2)
RBC # BLD AUTO: 4.39 M/UL (ref 4.2–5.4)
SODIUM SERPL-SCNC: 137 MMOL/L (ref 135–145)
TRIGL SERPL-MCNC: 69 MG/DL (ref 0–149)
TSH SERPL DL<=0.005 MIU/L-ACNC: 2.34 UIU/ML (ref 0.38–5.33)
WBC # BLD AUTO: 4.6 K/UL (ref 4.8–10.8)

## 2020-09-22 PROCEDURE — 80053 COMPREHEN METABOLIC PANEL: CPT

## 2020-09-22 PROCEDURE — 84443 ASSAY THYROID STIM HORMONE: CPT

## 2020-09-22 PROCEDURE — 85610 PROTHROMBIN TIME: CPT

## 2020-09-22 PROCEDURE — 85025 COMPLETE CBC W/AUTO DIFF WBC: CPT

## 2020-09-22 PROCEDURE — 83036 HEMOGLOBIN GLYCOSYLATED A1C: CPT

## 2020-09-22 PROCEDURE — 80061 LIPID PANEL: CPT

## 2020-09-22 PROCEDURE — 36415 COLL VENOUS BLD VENIPUNCTURE: CPT

## 2020-09-23 LAB
INR PPP: 1.05 (ref 0.87–1.13)
PROTHROMBIN TIME: 14 SEC (ref 12–14.6)

## 2020-11-02 ENCOUNTER — HOSPITAL ENCOUNTER (OUTPATIENT)
Dept: LAB | Facility: MEDICAL CENTER | Age: 55
End: 2020-11-02
Payer: COMMERCIAL

## 2020-11-03 LAB
COVID ORDER STATUS COVID19: NORMAL
SARS-COV-2 RNA RESP QL NAA+PROBE: DETECTED
SPECIMEN SOURCE: ABNORMAL

## 2021-09-22 ENCOUNTER — HOSPITAL ENCOUNTER (OUTPATIENT)
Dept: RADIOLOGY | Facility: MEDICAL CENTER | Age: 56
End: 2021-09-22
Attending: INTERNAL MEDICINE
Payer: COMMERCIAL

## 2021-09-22 DIAGNOSIS — Z12.31 VISIT FOR SCREENING MAMMOGRAM: ICD-10-CM

## 2021-09-22 PROCEDURE — 77063 BREAST TOMOSYNTHESIS BI: CPT

## 2021-10-12 ENCOUNTER — OFFICE VISIT (OUTPATIENT)
Dept: INTERNAL MEDICINE | Facility: OTHER | Age: 56
End: 2021-10-12
Payer: COMMERCIAL

## 2021-10-12 VITALS
HEIGHT: 58 IN | BODY MASS INDEX: 28.25 KG/M2 | SYSTOLIC BLOOD PRESSURE: 157 MMHG | DIASTOLIC BLOOD PRESSURE: 82 MMHG | TEMPERATURE: 97.6 F | OXYGEN SATURATION: 99 % | HEART RATE: 66 BPM | WEIGHT: 134.6 LBS

## 2021-10-12 DIAGNOSIS — N95.0 ABNORMAL VAGINAL BLEEDING IN POSTMENOPAUSAL PATIENT: ICD-10-CM

## 2021-10-12 DIAGNOSIS — R03.0 ELEVATED BLOOD-PRESSURE READING WITHOUT DIAGNOSIS OF HYPERTENSION: ICD-10-CM

## 2021-10-12 DIAGNOSIS — L60.3 CRACKED NAILS: ICD-10-CM

## 2021-10-12 PROBLEM — E66.9 OBESITY (BMI 30-39.9): Status: RESOLVED | Noted: 2019-03-20 | Resolved: 2021-10-12

## 2021-10-12 PROCEDURE — 99213 OFFICE O/P EST LOW 20 MIN: CPT | Mod: GC | Performed by: STUDENT IN AN ORGANIZED HEALTH CARE EDUCATION/TRAINING PROGRAM

## 2021-10-12 RX ORDER — UBIDECARENONE 75 MG
100 CAPSULE ORAL DAILY
COMMUNITY

## 2021-10-12 ASSESSMENT — ENCOUNTER SYMPTOMS
WHEEZING: 0
ABDOMINAL PAIN: 0
SHORTNESS OF BREATH: 0
FEVER: 0
CHILLS: 0
PALPITATIONS: 0
COUGH: 0
NAUSEA: 0
HEADACHES: 0
DIZZINESS: 0
WEAKNESS: 0
VOMITING: 0

## 2021-10-12 ASSESSMENT — FIBROSIS 4 INDEX: FIB4 SCORE: 1.61

## 2021-10-12 ASSESSMENT — PATIENT HEALTH QUESTIONNAIRE - PHQ9: CLINICAL INTERPRETATION OF PHQ2 SCORE: 0

## 2021-10-12 NOTE — PATIENT INSTRUCTIONS
Nice meeting you today Valeria!  Please check your blood pressure at home and keep a log for next visit  Please eat a healthy diet (more vegetables and fruits) and moderate exercise (30-40 min 5 days/week)  Referred to gynecology for evaluation  Please wait for gynecology to contact you through a phone call or letter  Have ultrasound performed prior to appointment with gynecologist

## 2021-10-12 NOTE — PROGRESS NOTES
"Chief Complaint   Patient presents with   • Menstrual Problem     episode of bleeding, last week   • Nail Problem     sometimes nails break in the middle       HISTORY OF PRESENT ILLNESS: Patient is a 56 y.o. female established patient who presents today for the following: abnormal uterine bleeding and nail problems.    Patient is , postmenopausal around \"15-20 years ago\", however noted that she \"had a period on Monday\" (9 days ago). She reports bright red blood on tissue after wiping and blood in the toilet, and denies seeing clots at that time. She then used a pad, stating that is did not soak through the pad and the episode resolved after 20 minutes, wherein she used the restroom again and did not notice any other signs of bleeding. Associated symptoms include cramping the night before, described as being similar to period pain, with spontaneous resolution. Of note, she had a similar episode of minimal vaginal spotting around late /beginning of , but no other episodes until a few days ago.    Patient states that she previously had heavy periods and has history of an abnormal pap smear. She notes that her most recent pap smear was done , which she said was normal.    She denies history of hormone therapy or taking birth control pills. Patient is sexually active with , but denies postcoital vaginal bleeding. She denies dizziness, pelvic pain, numbness or tingling, dysuria, difficulty urination, changes in defecation, history of ovarian cysts, or history of gynecological surgery. She states that she is a nonsmoker, however has worked in [employer] x 20 years with exposure to second hand smoke. She denies alcohol or drug use. Family history includes breast cancer in paternal aunt. She has not seen a gynecologist in the last few years.    Patient also reports multiple vertical cracks and pigmentation on nails, and has history of brittle nails. She does not get manicures done regularly. She has taken " biotin in the past, which did not help. She reports taking hair, nails, and skin vitamins regularly(Biotin, Vitamin D, Calcium, B12). TSH was evaluated last year which was WNL.      Past Medical History:   Diagnosis Date   • Abnormal vaginal Pap smear    • Dyslipidemia    • Elevated alkaline phosphatase level    • Fibrocystic breast disease    • GERD (gastroesophageal reflux disease)    • Glucose intolerance (impaired glucose tolerance)    • Hair loss    • Heart murmur    • Pap smear, as part of routine gynecological examination    • Preventative health care    • Urge incontinence    • Vitamin D deficiency        Patient Active Problem List    Diagnosis Date Noted   • Vitamin D deficiency 09/20/2017   • Dyslipidemia 09/20/2017   • Cracked nails 09/20/2017       Allergies:Patient has no known allergies.    Current Outpatient Medications   Medication Sig Dispense Refill   • cyanocobalamin (VITAMIN B-12) 100 MCG Tab Take 100 mcg by mouth every day.     • Calcium Carbonate (CALCIUM 500 PO) Take  by mouth.     • Multiple Vitamins-Minerals (HAIR SKIN AND NAILS FORMULA PO) Take  by mouth.     • Multiple Vitamins-Calcium (ONE-A-DAY WOMENS PO) Take  by mouth.     • vitamin D (CHOLECALCIFEROL) 1000 UNIT Tab Take 1,000 Units by mouth every day.       No current facility-administered medications for this visit.       Social History     Tobacco Use   • Smoking status: Passive Smoke Exposure - Never Smoker   • Smokeless tobacco: Never Used   Substance Use Topics   • Alcohol use: No   • Drug use: No       Family History   Problem Relation Age of Onset   • Cancer Paternal Aunt          Review of Systems   Review of Systems   Constitutional: Negative for chills and fever.   HENT: Negative for hearing loss.    Respiratory: Negative for cough, shortness of breath and wheezing.    Cardiovascular: Negative for chest pain and palpitations.   Gastrointestinal: Negative for abdominal pain, nausea and vomiting.   Genitourinary: Negative for  "dysuria and frequency.        Abnormal vaginal bleeding   Skin:        Brittle nails, linear hyperpigmented nail lines   Neurological: Negative for dizziness, weakness and headaches.       Exam:  /82 (BP Location: Left arm, Patient Position: Sitting, BP Cuff Size: Adult)   Pulse 66   Temp 36.4 °C (97.6 °F) (Temporal)   Ht 1.473 m (4' 10\")   Wt 61.1 kg (134 lb 9.6 oz)   SpO2 99%  Body mass index is 28.13 kg/m².  Constitutional:  NAD, well appearing.  HEENT:   NC/AT  Cardiovascular: RRR. No m/r/g. No carotid bruits.       Lungs:   CTAB, no w/r/r, no respiratory distress.  Abdomen: Soft, NT/ND + BS, no masses, no suprapubic tenderness, no guarding, no rigidity  Pelvic: Deferred  Extremities:  2+ DP and radial pulses bilaterally.  No c/c/e.  Skin:  Warm and dry, no rashes seen. Nails showed linear hyperpigmented lines noted on left 4th and 5th digits and right 1st and 2nd digits  Neurologic: Alert & oriented x 3, CN II-XII grossly intact, strength and sensation grossly intact.  No focal deficits noted.  Psychiatric:  Affect normal, mood normal, judgment normal.    Assessment/Plan:     1. Abnormal vaginal bleeding in postmenopausal patient  Heavy vaginal bleeding in a postmenopausal woman x 9 days ago  History of vaginal spotting noted less than a year ago  Not postcoital  Normal Pap smear 1 year ago, low concern for cervical etiology  Ovarian versus endometrial etiology probable, given history of nulliparity  Pelvic exam deferred at this time, discussed importance of follow-up  Physical exam otherwise normal    - REFERRAL TO GYNECOLOGY (advised patient if she does not hear from gynecology in 2 weeks, to contact us)  - US-PELVIC TRANSVAGINAL ONLY; Future (advised to do prior to seeing gynecologist)    2. Cracked nails  Has long history of brittle nails  Longitudinal hyperpigmented nail lines, consider trauma versus genetic etiology  Hyperpigmentation is mild, low concern for malignancy at this time  Does not " appear to be splinter hemorrhages, low concern for cardiac etiology at this time  TSH within normal limits  Has tried biotin in the past, which patient states did not help  Is currently taking hair, skin, nail vitamins    -Will continue to follow      3. Elevated blood-pressure reading without diagnosis of hypertension  In clinic today, /82 mmHg. Pt has no diagnosis of HTN in the past  Pt denies chest pain, palpitations, shortness of breath  Pt denies hx of CAD, CVA, or smoking  Family history of MI and stroke in father    -Patient states that she has a blood pressure monitor, advised to continuously check blood pressure daily at home and keep a log for next visit  -Educated on healthy lifestyle modifications    All imaging results and lab results and consult notes are reviewed at this visit.  Followup: Return in about 4 weeks (around 11/9/2021), or if symptoms worsen or fail to improve.    Please note that this dictation was created using voice recognition software. I have made every reasonable attempt to correct obvious errors, but I expect that there are errors of grammar and possibly content that I did not discover before finalizing the note.

## 2021-10-25 ENCOUNTER — HOSPITAL ENCOUNTER (OUTPATIENT)
Dept: RADIOLOGY | Facility: MEDICAL CENTER | Age: 56
End: 2021-10-25
Attending: STUDENT IN AN ORGANIZED HEALTH CARE EDUCATION/TRAINING PROGRAM
Payer: COMMERCIAL

## 2021-10-25 DIAGNOSIS — N95.0 ABNORMAL VAGINAL BLEEDING IN POSTMENOPAUSAL PATIENT: ICD-10-CM

## 2021-10-25 PROCEDURE — 76830 TRANSVAGINAL US NON-OB: CPT

## 2021-11-16 ENCOUNTER — OFFICE VISIT (OUTPATIENT)
Dept: INTERNAL MEDICINE | Facility: OTHER | Age: 56
End: 2021-11-16
Payer: COMMERCIAL

## 2021-11-16 VITALS
OXYGEN SATURATION: 99 % | DIASTOLIC BLOOD PRESSURE: 82 MMHG | SYSTOLIC BLOOD PRESSURE: 148 MMHG | TEMPERATURE: 97.8 F | WEIGHT: 134.6 LBS | BODY MASS INDEX: 28.13 KG/M2 | HEART RATE: 70 BPM

## 2021-11-16 DIAGNOSIS — F41.9 ANXIETY: ICD-10-CM

## 2021-11-16 DIAGNOSIS — R73.03 PRE-DIABETES: ICD-10-CM

## 2021-11-16 DIAGNOSIS — I10 PRIMARY HYPERTENSION: ICD-10-CM

## 2021-11-16 DIAGNOSIS — E78.5 DYSLIPIDEMIA: ICD-10-CM

## 2021-11-16 PROCEDURE — 99214 OFFICE O/P EST MOD 30 MIN: CPT | Mod: GC | Performed by: STUDENT IN AN ORGANIZED HEALTH CARE EDUCATION/TRAINING PROGRAM

## 2021-11-16 RX ORDER — LISINOPRIL 5 MG/1
5 TABLET ORAL DAILY
Qty: 30 TABLET | Refills: 3 | Status: SHIPPED | OUTPATIENT
Start: 2021-11-16 | End: 2022-03-16

## 2021-11-16 ASSESSMENT — ENCOUNTER SYMPTOMS
FEVER: 0
HEADACHES: 0
SHORTNESS OF BREATH: 0
ABDOMINAL PAIN: 0
PALPITATIONS: 0
DIZZINESS: 0
WEAKNESS: 0
COUGH: 0
CHILLS: 0
MYALGIAS: 0
NAUSEA: 0
VOMITING: 0

## 2021-11-16 ASSESSMENT — ANXIETY QUESTIONNAIRES
3. WORRYING TOO MUCH ABOUT DIFFERENT THINGS: NEARLY EVERY DAY
5. BEING SO RESTLESS THAT IT IS HARD TO SIT STILL: NOT AT ALL
7. FEELING AFRAID AS IF SOMETHING AWFUL MIGHT HAPPEN: NEARLY EVERY DAY
6. BECOMING EASILY ANNOYED OR IRRITABLE: SEVERAL DAYS
4. TROUBLE RELAXING: NEARLY EVERY DAY
2. NOT BEING ABLE TO STOP OR CONTROL WORRYING: NEARLY EVERY DAY
1. FEELING NERVOUS, ANXIOUS, OR ON EDGE: NEARLY EVERY DAY
GAD7 TOTAL SCORE: 16

## 2021-11-16 ASSESSMENT — FIBROSIS 4 INDEX: FIB4 SCORE: 1.61

## 2021-11-16 NOTE — PATIENT INSTRUCTIONS
-Recommend getting the Shingles (Shingrix) vaccine at your local pharmacy.  -Start taking one tab of lisinopril daily for blood pressure control. In addition, please continue checking your BP each day.   -We placed orders for annual labs (e.g. hemoglobin A1c, complete metabolic panel, and lipid panel). Please wait 2 weeks (after 11/30/21) to get these labs.  -Your transvaginal ultrasound was not concerning for cancer. The only finding was fibroids, which are non-cancerous muscular outgrowths of the uterus that occasionally bleed. You can take ibuprofen if you ever experience any cramping in the future.  -Follow up with this clinic in 1 month to re-check your BP.

## 2021-11-16 NOTE — PROGRESS NOTES
Chief Complaint   Patient presents with   • Follow-Up       HISTORY OF PRESENT ILLNESS: Patient is a 56 y.o. female established patient who presents today for the following: follow up    Previous visit, patient noted abnormal uterine bleeding. Results of the transvaginal ultrasound was reviewed. She denies any recurrence and denies dysmenorrhea/cramping/abdominal pain. Of note, contrary to last visit, patient has 2 children. She mentions that she has had increased stressors in her life, particularly her father passing away in June. Patient does not want to burden others and does not speak to her family about her problems and declined counseling at this time. She said that she exercises and meditates which helps clear her mind and ease anxiety. She denies any thoughts of hurting herself or others.    Patient has had history of elevated blood pressure notable during clinic visits. She followed up today with a daily blood pressure log with readings of 130s-140s/80s-90s at home and occasional SBP 150s-160s. She denies symptoms of headache, blurring of vision, chest pain, palpitations, shortness of breath. When asked about how she checks her blood pressure, she notes taking it in the morning before eating/drinking tea/coffee with arm at the level of the heart and resting on a table, with both feet on the floor without crossing the legs/ankles.    Last got flu vaccine in 1994 and was sick for 2 weeks, has not gotten it since. Patient states she will go to the pharmacy for her COVID booster. Denies history of chicken pox, patient said she will go to the pharmacy for her Zoster vaccine as well.       Past Medical History:   Diagnosis Date   • Abnormal vaginal Pap smear    • Dyslipidemia    • Elevated alkaline phosphatase level    • Fibrocystic breast disease    • GERD (gastroesophageal reflux disease)    • Glucose intolerance (impaired glucose tolerance)    • Hair loss    • Heart murmur    • Pap smear, as part of routine  gynecological examination    • Preventative health care    • Urge incontinence    • Vitamin D deficiency        Patient Active Problem List    Diagnosis Date Noted   • Abnormal vaginal bleeding in postmenopausal patient 10/12/2021   • Elevated blood pressure reading without diagnosis of hypertension 10/09/2019   • Vitamin D deficiency 09/20/2017   • Dyslipidemia 09/20/2017   • Cracked nails 09/20/2017       Allergies:Patient has no known allergies.    Current Outpatient Medications   Medication Sig Dispense Refill   • lisinopril (PRINIVIL) 5 MG Tab Take 1 Tablet by mouth every day for 120 days. 30 Tablet 3   • cyanocobalamin (VITAMIN B-12) 100 MCG Tab Take 100 mcg by mouth every day.     • Calcium Carbonate (CALCIUM 500 PO) Take  by mouth.     • Multiple Vitamins-Minerals (HAIR SKIN AND NAILS FORMULA PO) Take  by mouth.     • vitamin D (CHOLECALCIFEROL) 1000 UNIT Tab Take 1,000 Units by mouth every day.     • Multiple Vitamins-Calcium (ONE-A-DAY WOMENS PO) Take  by mouth. (Patient not taking: Reported on 11/16/2021)       No current facility-administered medications for this visit.       Social History     Tobacco Use   • Smoking status: Passive Smoke Exposure - Never Smoker   • Smokeless tobacco: Never Used   Substance Use Topics   • Alcohol use: No   • Drug use: No       Family History   Problem Relation Age of Onset   • Cancer Paternal Aunt         Breast CA         Review of Systems   Review of Systems   Constitutional: Negative for chills, fever and malaise/fatigue.   Respiratory: Negative for cough and shortness of breath.    Cardiovascular: Negative for chest pain and palpitations.   Gastrointestinal: Negative for abdominal pain, nausea and vomiting.   Genitourinary: Negative for dysuria and frequency.   Musculoskeletal: Negative for myalgias.   Neurological: Negative for dizziness, weakness and headaches.       Exam:  /82 (BP Location: Left arm, Patient Position: Sitting, BP Cuff Size: Adult)   Pulse  70   Temp 36.6 °C (97.8 °F) (Temporal)   Wt 61.1 kg (134 lb 9.6 oz)   SpO2 99%  Body mass index is 28.13 kg/m².  Constitutional:  Not in acute distress, well appearing.  HEENT:   NC/AT  Cardiovascular: Regular rate and rhythm. No murmurs or gallops.      Lungs:   Clear to auscultation bilaterally. No wheezes or crackles. No respiratory distress.  Abdomen: Not distended, soft, not tender. No guarding or rigidity. No masses.  Extremities:  No cyanosis/clubbing/edema. No obvious deformities.  Skin:  Warm and dry. No visible rashes.  Neurologic: Alert & oriented x 3, CN II-XII grossly intact, strength and sensation grossly intact.  No focal deficits noted.  Psychiatric:  Affect normal, mood normal, judgment normal. No SI/HI.    Assessment/Plan:     1. Primary hypertension  Elevated BP noted during previous clinic visits  Daily BP log with readings of 130s-140s/80s-90s at home with occasional SBP 150s-160s  Denies headache, blurring of vision, chest pain, palpitations, shortness of breath  Reported proper technique in taking blood pressure at home  Pt denies hx of CAD, CVA, or smoking  Family history of MI and stroke in father    - Start lisinopril 5mg daily  - Advised to continue checking BP at home  - Possible adverse effects of cough, angioedema, hypotension explained to patient to watch out for and advised to stop medication if it occurs  - Lifestyle modifications such as healthy diet and regular exercise encouraged  - Comp Metabolic Panel in 2 weeks, after starting ACEi     2. Dyslipidemia  Previous labs (9/22/2020): , TG 69, HDL 66, [id number]  Not on statin    - Lipid Profile; Future  - Lifestyle modifications such as healthy diet and regular exercise encouraged    3. Pre-diabetes  A1c (9/22/2020): 5.7%    - HEMOGLOBIN A1C; Future  - Lifestyle modifications such as healthy diet and regular exercise encouraged    4. Anxiety  Increased stressors in her life, particularly her [place]  not wanting to burden others and does not speak to her family about her problems  Exercising meditation helps clear her mind and ease anxiety  She denies any thoughts of hurting herself or others  VANCE-7 (11/16/21): 16, probable anxiety disorder  Declined counseling at this time and does not want to take medications    - Advised patient to return to clinic if symptoms worsen/feels overwhelmed  - Monitor      All imaging results and lab results and consult notes are reviewed at this visit.  Followup: Return in about 4 weeks (around 12/14/2021).    Please note that this dictation was created using voice recognition software. I have made every reasonable attempt to correct obvious errors, but I expect that there are errors of grammar and possibly content that I did not discover before finalizing the note.      Joi Lugo, PGY-1  Internal Medicine

## 2021-11-29 ENCOUNTER — TELEPHONE (OUTPATIENT)
Dept: INTERNAL MEDICINE | Facility: OTHER | Age: 56
End: 2021-11-29

## 2021-11-29 NOTE — TELEPHONE ENCOUNTER
----- Message from Joi Lugo M.D. sent at 11/29/2021  3:16 PM PST -----  Reviewed, cholesterol is high. Patient previously advised lifestyle modifications, advise to continue.

## 2022-01-10 ENCOUNTER — OFFICE VISIT (OUTPATIENT)
Dept: INTERNAL MEDICINE | Facility: OTHER | Age: 57
End: 2022-01-10
Payer: COMMERCIAL

## 2022-01-10 VITALS
HEIGHT: 60 IN | WEIGHT: 134.4 LBS | TEMPERATURE: 97.7 F | SYSTOLIC BLOOD PRESSURE: 138 MMHG | DIASTOLIC BLOOD PRESSURE: 76 MMHG | HEART RATE: 60 BPM | BODY MASS INDEX: 26.39 KG/M2 | OXYGEN SATURATION: 99 %

## 2022-01-10 DIAGNOSIS — I10 PRIMARY HYPERTENSION: ICD-10-CM

## 2022-01-10 DIAGNOSIS — Z11.59 ENCOUNTER FOR HEPATITIS C SCREENING TEST FOR LOW RISK PATIENT: ICD-10-CM

## 2022-01-10 DIAGNOSIS — E78.5 DYSLIPIDEMIA: ICD-10-CM

## 2022-01-10 PROCEDURE — 99214 OFFICE O/P EST MOD 30 MIN: CPT | Mod: GC | Performed by: STUDENT IN AN ORGANIZED HEALTH CARE EDUCATION/TRAINING PROGRAM

## 2022-01-10 RX ORDER — FERROUS SULFATE 325(65) MG
325 TABLET ORAL DAILY
COMMUNITY

## 2022-01-10 ASSESSMENT — ENCOUNTER SYMPTOMS
CHILLS: 0
FEVER: 0
WEAKNESS: 0
BACK PAIN: 0
DIZZINESS: 0
HEADACHES: 0
SHORTNESS OF BREATH: 0
NAUSEA: 0
PALPITATIONS: 0
NECK PAIN: 0
ABDOMINAL PAIN: 0
VOMITING: 0

## 2022-01-10 ASSESSMENT — FIBROSIS 4 INDEX: FIB4 SCORE: 1.69

## 2022-01-10 ASSESSMENT — PATIENT HEALTH QUESTIONNAIRE - PHQ9: CLINICAL INTERPRETATION OF PHQ2 SCORE: 0

## 2022-01-10 NOTE — PROGRESS NOTES
Established Patient    Patient Care Team:  Yudi Rodríguez M.D. as PCP - General (Internal Medicine)    HPI:  Valeria Feliciano is a 56 y.o. female who presents today with the following Chief Complaint(s):   Chief Complaint   Patient presents with   • Lab Results     review labs     Patient presents today to review lab results. Last visit lisinopril 5 mg daily was started to address elevated blood pressure readings, however patient states that she has not started taking medication. Today she reports that her BP ranges from 110-120/60-70 mmHg at home, however, she does not check her blood pressure regularly, and did not bring a log. Reports good diet and regular exercise but notes that if she eats tortillas and beans for dinner, her SBP in the AM can be around 120s-140s. She denies headache, blurry vision, chest pain, palpitations, shortness of breath.      Health maintenance  Said she will go to the pharmacy for COVID booster and zoster vaccine.   Declined flu vaccine.  Reports last pap smear was done 2 years ago, which she states was normal.  Has not been screened for hepatitis C, willing to get that done today.    Review of Systems   Review of Systems   Constitutional: Negative for chills, fever and malaise/fatigue.   Respiratory: Negative for shortness of breath.    Cardiovascular: Negative for chest pain and palpitations.   Gastrointestinal: Negative for abdominal pain, nausea and vomiting.   Musculoskeletal: Negative for back pain and neck pain.   Neurological: Negative for dizziness, weakness and headaches.         Past Medical History:   Diagnosis Date   • Abnormal vaginal Pap smear    • Dyslipidemia    • Elevated alkaline phosphatase level    • Fibrocystic breast disease    • GERD (gastroesophageal reflux disease)    • Glucose intolerance (impaired glucose tolerance)    • Hair loss    • Heart murmur    • Pap smear, as part of routine gynecological examination    • Preventative health care    • Urge  incontinence    • Vitamin D deficiency        Patient Active Problem List    Diagnosis Date Noted   • Abnormal vaginal bleeding in postmenopausal patient 10/12/2021   • Elevated blood pressure reading without diagnosis of hypertension 10/09/2019   • Vitamin D deficiency 09/20/2017   • Dyslipidemia 09/20/2017   • Cracked nails 09/20/2017       Allergies:Patient has no known allergies.    Current Outpatient Medications   Medication Sig Dispense Refill   • ferrous sulfate 325 (65 Fe) MG tablet Take 325 mg by mouth every day.     • cyanocobalamin (VITAMIN B-12) 100 MCG Tab Take 100 mcg by mouth every day.     • Calcium Carbonate (CALCIUM 500 PO) Take  by mouth.     • Multiple Vitamins-Minerals (HAIR SKIN AND NAILS FORMULA PO) Take  by mouth.     • vitamin D (CHOLECALCIFEROL) 1000 UNIT Tab Take 1,000 Units by mouth every day.     • lisinopril (PRINIVIL) 5 MG Tab Take 1 Tablet by mouth every day for 120 days. (Patient not taking: Reported on 1/10/2022) 30 Tablet 3     No current facility-administered medications for this visit.       Social History     Tobacco Use   • Smoking status: Passive Smoke Exposure - Never Smoker   • Smokeless tobacco: Never Used   Substance Use Topics   • Alcohol use: No   • Drug use: No       Family History   Problem Relation Age of Onset   • Cancer Paternal Aunt         Breast CA         Physical Exam  Vitals:  /76 (BP Location: Right arm, Patient Position: Sitting, BP Cuff Size: Adult)   Pulse 60   Temp 36.5 °C (97.7 °F) (Temporal)   Ht 1.524 m (5')   Wt 61 kg (134 lb 6.4 oz)   SpO2 99%  Body mass index is 26.25 kg/m².  Constitutional:  Not in acute distress, well appearing.  HEENT:   NC/AT. External ears normal with hearing grossly intact. Neck supple, no rigidity  Cardiovascular: Regular rate and rhythm. No murmurs/rubs/gallops.      Lungs:   Clear to auscultation bilaterally. No wheezes or crackles. No respiratory distress.  Abdomen: Not distended, soft, not tender. No guarding  or rigidity.  Extremities:  No cyanosis/clubbing/edema. No obvious deformities.  Skin:  Warm and dry.  No visible rashes.  Neurologic: Alert & oriented x 3, CN II-XII grossly intact, strength and sensation grossly intact.  No focal deficits noted.  Psychiatric:  Affect normal, mood normal, judgment normal.      Assessment and Plan:     1. Primary hypertension  Elevated BP noted during previous clinic visits  Denies headache, blurring of vision, chest pain, palpitations, shortness of breath  Reported proper technique in taking blood pressure at home  Pt denies hx of CAD, CVA, or smoking  Family history of MI and stroke in father  Previously reported BP log with readings of 130s-140s/80s-90s at home with occasional SBP 150s-160s, however, currently states that BP at home is around 110-120/60-70 mmHg  Has not started lisinopril     - Due to patient's age, Fhx of CVD and having multiple in office readings of BP >/=130/80mmHg with previous home readings of around 140s/80s mmHg, strongly recommended patient to take lisinopril. Discussed risks and benefits of taking the medication and elevated blood pressure readings and patient expressed understanding, however, declined taking medication at this time.   - Advised to continue checking BP at home and to bring a log to next visit.  - Lifestyle modifications such as healthy diet (decrease sodium intake of <3g/day, increase in fruits, vegetables, and whole grains) and regular exercise (30 min of moderate intensity around 5 times per week) encouraged.    2. Dyslipidemia  (11/22/2021) , , HDL 78,   Family history of DLD,   Not on statin therapy  ASCVD risk calculated to be 2.9%, low risk    - Patient does not need to be on statin therapy at this time. Will continue to monitor  - Lifestyle modifications such as healthy diet (high in fruits, vegetables, and whole grains) and regular exercise (30 min of moderate intensity around 5 times per week) encouraged      3. Encounter for hepatitis C screening test for low risk patient  - HEP C VIRUS ANTIBODY; Future      Return in about 6 months (around 7/10/2022), or if symptoms worsen or fail to improve.      Please note that this dictation was created using voice recognition software. I have made every reasonable attempt to correct obvious errors, but I expect that there are errors of grammar and possibly content that I did not discover before finalizing the note.    Total face-to-face time spent in medical decision makinmin    Joi Lugo M.D. PGY-1  [facility]

## 2022-01-10 NOTE — PATIENT INSTRUCTIONS
Follow up in 6 months to check up on blood pressure, diet/exercise, and water intake. Continue checking blood pressure 3 times a day when able, preferably once in the morning, once before a mid-day meal, and once before bedtime. Continue monitoring diet, particularly avoiding red meats and increasing intake of leaner proteins, such as fish/chicken/turkey. Fruits, vegetables, and whole grains are good sources of carbohydrates. Monitor salt intake. Continue with regular exercise 30 minutes at least 5 days a week, total of 150 minutes a week of moderate-intensity exercise. You can hold off on the lisinopril in the mean time. We will reassess your blood pressure at the next visit.    Dyslipidemia  Dyslipidemia is an imbalance of waxy, fat-like substances (lipids) in the blood. The body needs lipids in small amounts. Dyslipidemia often involves a high level of cholesterol or triglycerides, which are types of lipids.  Common forms of dyslipidemia include:  · High levels of LDL cholesterol. LDL is the type of cholesterol that causes fatty deposits (plaques) to build up in the blood vessels that carry blood away from your heart (arteries).  · Low levels of HDL cholesterol. HDL cholesterol is the type of cholesterol that protects against heart disease. High levels of HDL remove the LDL buildup from arteries.  · High levels of triglycerides. Triglycerides are a fatty substance in the blood that is linked to a buildup of plaques in the arteries.  What are the causes?  Primary dyslipidemia is caused by changes (mutations) in genes that are passed down through families (inherited). These mutations cause several types of dyslipidemia.  Secondary dyslipidemia is caused by lifestyle choices and diseases that lead to dyslipidemia, such as:  · Eating a diet that is high in animal fat.  · Not getting enough exercise.  · Having diabetes, kidney disease, liver disease, or thyroid disease.  · Drinking large amounts of alcohol.  · Using  certain medicines.  What increases the risk?  You are more likely to develop this condition if you are an older man or if you are a woman who has gone through menopause. Other risk factors include:  · Having a family history of dyslipidemia.  · Taking certain medicines, including birth control pills, steroids, some diuretics, and beta-blockers.  · Smoking cigarettes.  · Eating a high-fat diet.  · Having certain medical conditions such as diabetes, polycystic ovary syndrome (PCOS), kidney disease, liver disease, or hypothyroidism.  · Not exercising regularly.  · Being overweight or obese with too much belly fat.  What are the signs or symptoms?  In most cases, dyslipidemia does not usually cause any symptoms.  In severe cases, very high lipid levels can cause:  · Fatty bumps under the skin (xanthomas).  · White or gray ring around the black center (pupil) of the eye.  Very high triglyceride levels can cause inflammation of the pancreas (pancreatitis).  How is this diagnosed?    Your health care provider may diagnose dyslipidemia based on a routine blood test (fasting blood test). Because most people do not have symptoms of the condition, this blood testing (lipid profile) is done on adults age 20 and older and is repeated every 5 years. This test checks:  · Total cholesterol. This measures the total amount of cholesterol in your blood, including LDL cholesterol, HDL cholesterol, and triglycerides. A healthy number is below 200.  · LDL cholesterol. The target number for LDL cholesterol is different for each person, depending on individual risk factors. Ask your health care provider what your LDL cholesterol should be.  · HDL cholesterol. An HDL level of 60 or higher is best because it helps to protect against heart disease. A number below 40 for men or below 50 for women increases the risk for heart disease.  · Triglycerides. A healthy triglyceride number is below 150.  If your lipid profile is abnormal, your health  care provider may do other blood tests.  How is this treated?  Treatment depends on the type of dyslipidemia that you have and your other risk factors for heart disease and stroke. Your health care provider will have a target range for your lipid levels based on this information.  For many people, this condition may be treated by lifestyle changes, such as diet and exercise. Your health care provider may recommend that you:  · Get regular exercise.  · Make changes to your diet.  · Quit smoking if you smoke.  If diet changes and exercise do not help you reach your goals, your health care provider may also prescribe medicine to lower lipids. The most commonly prescribed type of medicine lowers your LDL cholesterol (statin drug). If you have a high triglyceride level, your provider may prescribe another type of drug (fibrate) or an omega-3 fish oil supplement, or both.  Follow these instructions at home:       Eating and drinking  · Follow instructions from your health care provider or dietitian about eating or drinking restrictions.  · Eat a healthy diet as told by your health care provider. This can help you reach and maintain a healthy weight, lower your LDL cholesterol, and raise your HDL cholesterol. This may include:  ? Limiting your calories, if you are overweight.  ? Eating more fruits, vegetables, whole grains, fish, and lean meats.  ? Limiting saturated fat, trans fat, and cholesterol.  · If you drink alcohol:  ? Limit how much you use.  ? Be aware of how much alcohol is in your drink. In the [place] one drink equals one 12 oz bottle of beer (355 mL), one 5 oz glass of wine (148 mL), or one 1½ oz glass of hard liquor (44 mL).  · Do not drink alcohol if:  ? Your health care provider tells you not to drink.  ? You are pregnant, may be pregnant, or are planning to become pregnant.  Activity  · Get regular exercise. Start an exercise and strength training program as told by your health care provider. Ask your  health care provider what activities are safe for you. Your health care provider may recommend:  ? 30 minutes of aerobic activity 4-6 days a week. Brisk walking is an example of aerobic activity.  ? Strength training 2 days a week.  General instructions  · Do not use any products that contain nicotine or tobacco, such as cigarettes, e-cigarettes, and chewing tobacco. If you need help quitting, ask your health care provider.  · Take over-the-counter and prescription medicines only as told by your health care provider. This includes supplements.  · Keep all follow-up visits as told by your health care provider.  Contact a health care provider if:  · You are:  ? Having trouble sticking to your exercise or diet plan.  ? Struggling to quit smoking or control your use of alcohol.  Summary  · Dyslipidemia often involves a high level of cholesterol or triglycerides, which are types of lipids.  · Treatment depends on the type of dyslipidemia that you have and your other risk factors for heart disease and stroke.  · For many people, treatment starts with lifestyle changes, such as diet and exercise.  · Your health care provider may prescribe medicine to lower lipids.  This information is not intended to replace advice given to you by your health care provider. Make sure you discuss any questions you have with your health care provider.    Document Released: 12/23/2014 Document Revised: 08/12/2019 Document Reviewed: 07/19/2019  [employer] Patient Education © 2020 [employer]

## 2022-11-01 ENCOUNTER — PHARMACY VISIT (OUTPATIENT)
Dept: PHARMACY | Facility: MEDICAL CENTER | Age: 57
End: 2022-11-01
Payer: COMMERCIAL

## 2022-11-01 PROCEDURE — RXMED WILLOW AMBULATORY MEDICATION CHARGE: Performed by: PHARMACIST

## 2022-11-01 RX ORDER — INFLUENZA A VIRUS A/BRISBANE/02/2018 IVR-190 (H1N1) ANTIGEN (FORMALDEHYDE INACTIVATED), INFLUENZA A VIRUS A/KANSAS/14/2017 X-327 (H3N2) ANTIGEN (FORMALDEHYDE INACTIVATED), INFLUENZA B VIRUS B/PHUKET/3073/2013 ANTIGEN (FORMALDEHYDE INACTIVATED), AND INFLUENZA B VIRUS B/MARYLAND/15/2016 BX-69A ANTIGEN (FORMALDEHYDE INACTIVATED) 15; 15; 15; 15 UG/.5ML; UG/.5ML; UG/.5ML; UG/.5ML
INJECTION, SUSPENSION INTRAMUSCULAR
Qty: 0.5 ML | Refills: 0 | Status: SHIPPED | OUTPATIENT
Start: 2022-10-20 | End: 2022-12-12

## 2022-12-12 ENCOUNTER — OFFICE VISIT (OUTPATIENT)
Dept: INTERNAL MEDICINE | Facility: OTHER | Age: 57
End: 2022-12-12
Payer: COMMERCIAL

## 2022-12-12 VITALS
HEART RATE: 73 BPM | WEIGHT: 144.4 LBS | OXYGEN SATURATION: 99 % | SYSTOLIC BLOOD PRESSURE: 152 MMHG | BODY MASS INDEX: 28.35 KG/M2 | TEMPERATURE: 98.7 F | HEIGHT: 60 IN | DIASTOLIC BLOOD PRESSURE: 84 MMHG

## 2022-12-12 DIAGNOSIS — E78.5 DYSLIPIDEMIA: ICD-10-CM

## 2022-12-12 DIAGNOSIS — Z13.1 SCREENING FOR DIABETES MELLITUS: ICD-10-CM

## 2022-12-12 DIAGNOSIS — I10 PRIMARY HYPERTENSION: ICD-10-CM

## 2022-12-12 DIAGNOSIS — R42 DIZZINESS: ICD-10-CM

## 2022-12-12 PROCEDURE — 99214 OFFICE O/P EST MOD 30 MIN: CPT | Performed by: INTERNAL MEDICINE

## 2022-12-12 RX ORDER — LISINOPRIL 5 MG/1
5 TABLET ORAL DAILY
Qty: 30 TABLET | Refills: 2 | Status: SHIPPED | OUTPATIENT
Start: 2022-12-12 | End: 2023-01-09

## 2022-12-12 NOTE — PROGRESS NOTES
date  Chief Complaint   Patient presents with    Dizziness     X 4 days. After she throw up she felt better        HISTORY OF PRESENT ILLNESS: Patient is a 57 y.o. female established patient who presents today for the following.      1. Dizziness  Patient states she had couple of episodes of dizziness when she was fasting last week.  After throwing up she felt better.  Denies any headaches or dizziness today but she noticed it prominently from sitting to standing positions when it happened.  No chest pain or associated sweating    2. Primary hypertension  Patient has not taken her lisinopril after giving it a try for 5 days.  She thought she will be okay without medication for blood pressure control.  Wanted to try diet and exercise but has gained 10 pounds in the last 1 year    3. Dyslipidemia  Trying to watch her diet and trying to avoid medication    4. Screening for diabetes mellitus  Interested in getting screening for diabetes given history and  origin      Past Medical History:   Diagnosis Date    Abnormal vaginal Pap smear     Dyslipidemia     Elevated alkaline phosphatase level     Fibrocystic breast disease     GERD (gastroesophageal reflux disease)     Glucose intolerance (impaired glucose tolerance)     Hair loss     Heart murmur     Pap smear, as part of routine gynecological examination     Preventative health care     Urge incontinence     Vitamin D deficiency        Patient Active Problem List    Diagnosis Date Noted    Dizziness 12/12/2022    Abnormal vaginal bleeding in postmenopausal patient 10/12/2021    Primary hypertension 10/09/2019    Vitamin D deficiency 09/20/2017    Dyslipidemia 09/20/2017    Cracked nails 09/20/2017       Allergies:Patient has no known allergies.    Current Outpatient Medications   Medication Sig Dispense Refill    lisinopril (PRINIVIL) 5 MG Tab Take 1 Tablet by mouth every day. 30 Tablet 2    ferrous sulfate 325 (65 Fe) MG tablet Take 325 mg by mouth every day.       cyanocobalamin (VITAMIN B-12) 100 MCG Tab Take 100 mcg by mouth every day.      Calcium Carbonate (CALCIUM 500 PO) Take  by mouth.      vitamin D (CHOLECALCIFEROL) 1000 UNIT Tab Take 1,000 Units by mouth every day.       No current facility-administered medications for this visit.       Social History     Tobacco Use    Smoking status: Passive Smoke Exposure - Never Smoker    Smokeless tobacco: Never   Vaping Use    Vaping Use: Never used   Substance Use Topics    Alcohol use: No    Drug use: No       Family History   Problem Relation Age of Onset    Cancer Paternal Aunt         Breast CA         Review of Systems   Patient denies angella/chills/chest pain/sob/blood in stools/black stools/blood in urine.all other systems are reviewed See HPI    Exam:  BP (!) 152/84 (BP Location: Left arm, Patient Position: Standing, BP Cuff Size: Small adult)   Pulse 73   Temp 37.1 °C (98.7 °F) (Temporal)   Ht 1.524 m (5')   Wt 65.5 kg (144 lb 6.4 oz)   SpO2 99%  Body mass index is 28.2 kg/m².  Constitutional:  NAD, well appearing.  HEENT:   NC/AT  Cardiovascular: RRR.   No m/r/g. No carotid bruits.       Lungs:   CTAB, no w/r/r, no respiratory distress.  Abdomen: Soft, NT/ND + BS, no masses, no suprapubic tenderness, no hepatomegaly.  Extremities:  2+ DP and radial pulses bilaterally.  No c/c/e.  Skin:  Warm and dry.    Neurologic: Alert & oriented x 3, CN II-XII grossly intact, strength and sensation grossly intact.  No focal deficits noted.  Psychiatric:  Affect normal, mood normal, judgment normal.    Assessment/Plan:     1. Dizziness  Patient's orthostatics were checked and within normal today.  Very possible that she had hypotension episode when he she was fasting versus gastritis that caused vomiting.  We will check for anemia thyroid issues, B12 deficiency and vitamin D to evaluate further  - CBC WITH DIFFERENTIAL; Future  - TSH WITH REFLEX TO FT4; Future  - VITAMIN B12; Future  - VITAMIN D,25 HYDROXY (DEFICIENCY);  Future    2. Primary hypertension  Blood pressure today running high and we discussed about prior elevated blood pressure also an issue so the low-dose lisinopril to be restarted which the patient agreed and we can reassess labs  - Comp Metabolic Panel; Future  - lisinopril (PRINIVIL) 5 MG Tab; Take 1 Tablet by mouth every day.  Dispense: 30 Tablet; Refill: 2    3. Dyslipidemia  Recheck labs and diet and exercise advised  - Lipid Profile; Future    4. Screening for diabetes mellitus  Check for diabetes  - HEMOGLOBIN A1C; Future      All imaging results and lab results and consult notes are reviewed at this visit.  Followup: Return in about 3 months (around 3/12/2023) for Pap Smear and Annual.    Please note that this dictation was created using voice recognition software. I have made every reasonable attempt to correct obvious errors, but I expect that there are errors of grammar and possibly content that I did not discover before finalizing the note.

## 2023-01-09 ENCOUNTER — OFFICE VISIT (OUTPATIENT)
Dept: INTERNAL MEDICINE | Facility: OTHER | Age: 58
End: 2023-01-09
Payer: COMMERCIAL

## 2023-01-09 VITALS
WEIGHT: 141.2 LBS | HEART RATE: 65 BPM | TEMPERATURE: 98.3 F | OXYGEN SATURATION: 97 % | DIASTOLIC BLOOD PRESSURE: 65 MMHG | BODY MASS INDEX: 27.72 KG/M2 | HEIGHT: 60 IN | SYSTOLIC BLOOD PRESSURE: 137 MMHG

## 2023-01-09 DIAGNOSIS — R42 DIZZINESS: ICD-10-CM

## 2023-01-09 DIAGNOSIS — E78.5 DYSLIPIDEMIA: ICD-10-CM

## 2023-01-09 DIAGNOSIS — R74.8 ELEVATED LIVER ENZYMES: ICD-10-CM

## 2023-01-09 PROCEDURE — 99214 OFFICE O/P EST MOD 30 MIN: CPT | Performed by: INTERNAL MEDICINE

## 2023-01-09 PROCEDURE — 93000 ELECTROCARDIOGRAM COMPLETE: CPT | Performed by: INTERNAL MEDICINE

## 2023-01-09 ASSESSMENT — PATIENT HEALTH QUESTIONNAIRE - PHQ9: CLINICAL INTERPRETATION OF PHQ2 SCORE: 0

## 2023-01-09 ASSESSMENT — FIBROSIS 4 INDEX: FIB4 SCORE: 1.84

## 2023-01-09 NOTE — PROGRESS NOTES
date  Chief Complaint   Patient presents with    Follow-Up     Medication        HISTORY OF PRESENT ILLNESS: Patient is a 57 y.o. female established patient who presents today for the following.      1. Elevated liver enzymes  Recently had labs done and here for follow-up of her results.  States she has not been sick or had any issues with jaundice.  Denies recent travel    2. Dyslipidemia  Has been trying to eat healthy and stay active.  Has few notes daily in her breakfast.  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 Dizziness  Patient continues to have occasional lightheadedness but never had any falls.  She notices mainly when she is climbing up the stairs and she tries to hold onto the railing.  Feels little woozy but also trying to drink plenty of water.      Past Medical History:   Diagnosis Date    Abnormal vaginal Pap smear     Dyslipidemia     Elevated alkaline phosphatase level     Fibrocystic breast disease     GERD (gastroesophageal reflux disease)     Glucose intolerance (impaired glucose tolerance)     Hair loss     Heart murmur     Pap smear, as part of routine gynecological examination     Preventative health care     Urge incontinence     Vitamin D deficiency        Patient Active Problem List    Diagnosis Date Noted    Dizziness 12/12/2022    Abnormal vaginal bleeding in postmenopausal patient 10/12/2021    Primary hypertension 10/09/2019    Vitamin D deficiency 09/20/2017    Dyslipidemia 09/20/2017    Cracked nails 09/20/2017    Elevated liver enzymes 06/03/2016       Allergies:Patient has no known allergies.    Current Outpatient Medications   Medication Sig Dispense Refill    ferrous sulfate 325 (65 Fe) MG tablet Take 325 mg by mouth every day.      cyanocobalamin (VITAMIN B-12) 100 MCG Tab Take 100 mcg by mouth every day.      Calcium Carbonate (CALCIUM 500 PO) Take   by mouth.      vitamin D (CHOLECALCIFEROL) 1000 UNIT Tab Take 1,000 Units by mouth every day.       No current facility-administered medications for this visit.       Social History     Tobacco Use    Smoking status: Passive Smoke Exposure - Never Smoker    Smokeless tobacco: Never   Vaping Use    Vaping Use: Never used   Substance Use Topics    Alcohol use: No    Drug use: No       Family History   Problem Relation Age of Onset    Cancer Paternal Aunt         Breast CA         Review of Systems   Patient denies Fevers/chills/nausea/vomiting/chest pain/sob/blood in stools/black stools/blood in urine.all other systems are reviewed See HPI    Exam:  /65 (BP Location: Right arm, Patient Position: Sitting, BP Cuff Size: Adult)   Pulse 65   Temp 36.8 °C (98.3 °F) (Temporal)   Ht 1.524 m (5')   Wt 64 kg (141 lb 3.2 oz)   SpO2 97%  Body mass index is 27.58 kg/m².  Constitutional:  NAD, well appearing.  HEENT:   NC/AT  Cardiovascular: RRR.   No m/r/g. No carotid bruits.       Lungs:   CTAB, no w/r/r, no respiratory distress.  Abdomen: Soft, NT/ND + BS, no masses, no suprapubic tenderness, no hepatomegaly.  Extremities:  2+ DP and radial pulses bilaterally.  No c/c/e.  Skin:  Warm and dry.    Neurologic: Alert & oriented x 3, CN II-XII grossly intact, strength and sensation grossly intact.  No focal deficits noted.  Psychiatric:  Affect normal, mood normal, judgment normal.    Assessment/Plan:     1. Elevated liver enzymes  Discussed in length with patient screen for hepatitis B. C was negative in the past.  Check ultrasound and also look for autoimmune conditions.  Currently asymptomatic and no jaundice.  Likely fatty liver  - HEP B SURFACE ANTIGEN; Future  - US-RUQ; Future  - JOCY REFLEXIVE PROFILE; Future  - HEP B CORE AB TOTAL; Future    2. Dyslipidemia  Likely fatty liver.  Discussed about diet and exercise to be maintained well.  No need for statins at this time  10 yr Cardiovascular risk is 3.5%    3.  Dizziness  EKG in the office showed sinus rhythm at roughly 60 to heart rate there is T wave inversion in lead III and aVF but otherwise no ST segment elevation or depression.  VA interval 196 ms  ms.  We will review echocardiogram to assess further but she does not have any other issues at this time.  - EKG - Clinic Performed  - EC-ECHOCARDIOGRAM COMPLETE W/ CONT; Future      All imaging results and lab results and consult notes are reviewed at this visit.  Followup: Return in about 2 months (around 3/9/2023) for Annual and PAP smear.    Please note that this dictation was created using voice recognition software. I have made every reasonable attempt to correct obvious errors, but I expect that there are errors of grammar and possibly content that I did not discover before finalizing the note.

## 2023-02-07 ENCOUNTER — HOSPITAL ENCOUNTER (OUTPATIENT)
Dept: RADIOLOGY | Facility: MEDICAL CENTER | Age: 58
End: 2023-02-07
Attending: INTERNAL MEDICINE
Payer: COMMERCIAL

## 2023-02-07 DIAGNOSIS — R74.8 ELEVATED LIVER ENZYMES: ICD-10-CM

## 2023-02-07 PROCEDURE — 76705 ECHO EXAM OF ABDOMEN: CPT

## 2023-03-14 ENCOUNTER — HOSPITAL ENCOUNTER (OUTPATIENT)
Dept: CARDIOLOGY | Facility: MEDICAL CENTER | Age: 58
End: 2023-03-14
Attending: INTERNAL MEDICINE
Payer: COMMERCIAL

## 2023-03-14 ENCOUNTER — HOSPITAL ENCOUNTER (OUTPATIENT)
Dept: LAB | Facility: MEDICAL CENTER | Age: 58
End: 2023-03-14
Attending: INTERNAL MEDICINE
Payer: COMMERCIAL

## 2023-03-14 DIAGNOSIS — R74.8 ELEVATED LIVER ENZYMES: ICD-10-CM

## 2023-03-14 DIAGNOSIS — R42 DIZZINESS: ICD-10-CM

## 2023-03-14 DIAGNOSIS — Z13.1 SCREENING FOR DIABETES MELLITUS: ICD-10-CM

## 2023-03-14 DIAGNOSIS — E78.5 DYSLIPIDEMIA: ICD-10-CM

## 2023-03-14 DIAGNOSIS — I10 PRIMARY HYPERTENSION: ICD-10-CM

## 2023-03-14 LAB
25(OH)D3 SERPL-MCNC: 59 NG/ML (ref 30–100)
ALBUMIN SERPL BCP-MCNC: 3.9 G/DL (ref 3.2–4.9)
ALBUMIN/GLOB SERPL: 1.3 G/DL
ALP SERPL-CCNC: 135 U/L (ref 30–99)
ALT SERPL-CCNC: 47 U/L (ref 2–50)
ANION GAP SERPL CALC-SCNC: 10 MMOL/L (ref 7–16)
AST SERPL-CCNC: 36 U/L (ref 12–45)
BASOPHILS # BLD AUTO: 0.6 % (ref 0–1.8)
BASOPHILS # BLD: 0.03 K/UL (ref 0–0.12)
BILIRUB SERPL-MCNC: 0.4 MG/DL (ref 0.1–1.5)
BUN SERPL-MCNC: 13 MG/DL (ref 8–22)
CALCIUM ALBUM COR SERPL-MCNC: 9.6 MG/DL (ref 8.5–10.5)
CALCIUM SERPL-MCNC: 9.5 MG/DL (ref 8.4–10.2)
CHLORIDE SERPL-SCNC: 104 MMOL/L (ref 96–112)
CHOLEST SERPL-MCNC: 250 MG/DL (ref 100–199)
CO2 SERPL-SCNC: 26 MMOL/L (ref 20–33)
CREAT SERPL-MCNC: 0.69 MG/DL (ref 0.5–1.4)
EOSINOPHIL # BLD AUTO: 0.08 K/UL (ref 0–0.51)
EOSINOPHIL NFR BLD: 1.6 % (ref 0–6.9)
ERYTHROCYTE [DISTWIDTH] IN BLOOD BY AUTOMATED COUNT: 42.2 FL (ref 35.9–50)
EST. AVERAGE GLUCOSE BLD GHB EST-MCNC: 114 MG/DL
FASTING STATUS PATIENT QL REPORTED: NORMAL
GFR SERPLBLD CREATININE-BSD FMLA CKD-EPI: 99 ML/MIN/1.73 M 2
GLOBULIN SER CALC-MCNC: 3.1 G/DL (ref 1.9–3.5)
GLUCOSE SERPL-MCNC: 90 MG/DL (ref 65–99)
HBA1C MFR BLD: 5.6 % (ref 4–5.6)
HBV CORE AB SERPL QL IA: NONREACTIVE
HBV SURFACE AG SER QL: NORMAL
HCT VFR BLD AUTO: 41.3 % (ref 37–47)
HDLC SERPL-MCNC: 69 MG/DL
HGB BLD-MCNC: 13.9 G/DL (ref 12–16)
IMM GRANULOCYTES # BLD AUTO: 0.01 K/UL (ref 0–0.11)
IMM GRANULOCYTES NFR BLD AUTO: 0.2 % (ref 0–0.9)
LDLC SERPL CALC-MCNC: 162 MG/DL
LV EJECT FRACT  99904: 58
LV EJECT FRACT MOD 2C 99903: 63.26
LV EJECT FRACT MOD 4C 99902: 49.83
LV EJECT FRACT MOD BP 99901: 57.72
LYMPHOCYTES # BLD AUTO: 1.5 K/UL (ref 1–4.8)
LYMPHOCYTES NFR BLD: 30.5 % (ref 22–41)
MCH RBC QN AUTO: 31 PG (ref 27–33)
MCHC RBC AUTO-ENTMCNC: 33.7 G/DL (ref 33.6–35)
MCV RBC AUTO: 92 FL (ref 81.4–97.8)
MONOCYTES # BLD AUTO: 0.24 K/UL (ref 0–0.85)
MONOCYTES NFR BLD AUTO: 4.9 % (ref 0–13.4)
NEUTROPHILS # BLD AUTO: 3.06 K/UL (ref 2–7.15)
NEUTROPHILS NFR BLD: 62.2 % (ref 44–72)
NRBC # BLD AUTO: 0 K/UL
NRBC BLD-RTO: 0 /100 WBC
PLATELET # BLD AUTO: 228 K/UL (ref 164–446)
PMV BLD AUTO: 10.9 FL (ref 9–12.9)
POTASSIUM SERPL-SCNC: 4.1 MMOL/L (ref 3.6–5.5)
PROT SERPL-MCNC: 7 G/DL (ref 6–8.2)
RBC # BLD AUTO: 4.49 M/UL (ref 4.2–5.4)
SODIUM SERPL-SCNC: 140 MMOL/L (ref 135–145)
TRIGL SERPL-MCNC: 94 MG/DL (ref 0–149)
TSH SERPL DL<=0.005 MIU/L-ACNC: 2.59 UIU/ML (ref 0.38–5.33)
VIT B12 SERPL-MCNC: 1507 PG/ML (ref 211–911)
WBC # BLD AUTO: 4.9 K/UL (ref 4.8–10.8)

## 2023-03-14 PROCEDURE — 86704 HEP B CORE ANTIBODY TOTAL: CPT

## 2023-03-14 PROCEDURE — 84443 ASSAY THYROID STIM HORMONE: CPT

## 2023-03-14 PROCEDURE — 36415 COLL VENOUS BLD VENIPUNCTURE: CPT

## 2023-03-14 PROCEDURE — 86038 ANTINUCLEAR ANTIBODIES: CPT

## 2023-03-14 PROCEDURE — 85025 COMPLETE CBC W/AUTO DIFF WBC: CPT

## 2023-03-14 PROCEDURE — 82306 VITAMIN D 25 HYDROXY: CPT

## 2023-03-14 PROCEDURE — 93306 TTE W/DOPPLER COMPLETE: CPT

## 2023-03-14 PROCEDURE — 87340 HEPATITIS B SURFACE AG IA: CPT

## 2023-03-14 PROCEDURE — 83036 HEMOGLOBIN GLYCOSYLATED A1C: CPT

## 2023-03-14 PROCEDURE — 80061 LIPID PANEL: CPT

## 2023-03-14 PROCEDURE — 80053 COMPREHEN METABOLIC PANEL: CPT

## 2023-03-14 PROCEDURE — 93306 TTE W/DOPPLER COMPLETE: CPT | Mod: 26 | Performed by: INTERNAL MEDICINE

## 2023-03-14 PROCEDURE — 82607 VITAMIN B-12: CPT

## 2023-03-15 LAB — NUCLEAR IGG SER QL IA: NORMAL

## 2023-03-24 SDOH — ECONOMIC STABILITY: HOUSING INSECURITY
IN THE LAST 12 MONTHS, WAS THERE A TIME WHEN YOU DID NOT HAVE A STEADY PLACE TO SLEEP OR SLEPT IN A SHELTER (INCLUDING NOW)?: PATIENT REFUSED

## 2023-03-24 SDOH — ECONOMIC STABILITY: FOOD INSECURITY: WITHIN THE PAST 12 MONTHS, THE FOOD YOU BOUGHT JUST DIDN'T LAST AND YOU DIDN'T HAVE MONEY TO GET MORE.: PATIENT DECLINED

## 2023-03-24 SDOH — HEALTH STABILITY: PHYSICAL HEALTH: ON AVERAGE, HOW MANY MINUTES DO YOU ENGAGE IN EXERCISE AT THIS LEVEL?: 40 MIN

## 2023-03-24 SDOH — HEALTH STABILITY: MENTAL HEALTH
STRESS IS WHEN SOMEONE FEELS TENSE, NERVOUS, ANXIOUS, OR CAN'T SLEEP AT NIGHT BECAUSE THEIR MIND IS TROUBLED. HOW STRESSED ARE YOU?: ONLY A LITTLE

## 2023-03-24 SDOH — ECONOMIC STABILITY: HOUSING INSECURITY

## 2023-03-24 SDOH — ECONOMIC STABILITY: TRANSPORTATION INSECURITY
IN THE PAST 12 MONTHS, HAS THE LACK OF TRANSPORTATION KEPT YOU FROM MEDICAL APPOINTMENTS OR FROM GETTING MEDICATIONS?: PATIENT DECLINED

## 2023-03-24 SDOH — ECONOMIC STABILITY: TRANSPORTATION INSECURITY
IN THE PAST 12 MONTHS, HAS LACK OF TRANSPORTATION KEPT YOU FROM MEETINGS, WORK, OR FROM GETTING THINGS NEEDED FOR DAILY LIVING?: PATIENT DECLINED

## 2023-03-24 SDOH — ECONOMIC STABILITY: INCOME INSECURITY: HOW HARD IS IT FOR YOU TO PAY FOR THE VERY BASICS LIKE FOOD, HOUSING, MEDICAL CARE, AND HEATING?: NOT HARD AT ALL

## 2023-03-24 SDOH — ECONOMIC STABILITY: FOOD INSECURITY: WITHIN THE PAST 12 MONTHS, YOU WORRIED THAT YOUR FOOD WOULD RUN OUT BEFORE YOU GOT MONEY TO BUY MORE.: PATIENT DECLINED

## 2023-03-24 SDOH — ECONOMIC STABILITY: INCOME INSECURITY: IN THE LAST 12 MONTHS, WAS THERE A TIME WHEN YOU WERE NOT ABLE TO PAY THE MORTGAGE OR RENT ON TIME?: PATIENT REFUSED

## 2023-03-24 SDOH — HEALTH STABILITY: PHYSICAL HEALTH: ON AVERAGE, HOW MANY DAYS PER WEEK DO YOU ENGAGE IN MODERATE TO STRENUOUS EXERCISE (LIKE A BRISK WALK)?: 5 DAYS

## 2023-03-24 SDOH — ECONOMIC STABILITY: TRANSPORTATION INSECURITY
IN THE PAST 12 MONTHS, HAS LACK OF RELIABLE TRANSPORTATION KEPT YOU FROM MEDICAL APPOINTMENTS, MEETINGS, WORK OR FROM GETTING THINGS NEEDED FOR DAILY LIVING?: PATIENT DECLINED

## 2023-03-24 ASSESSMENT — LIFESTYLE VARIABLES
HOW OFTEN DO YOU HAVE SIX OR MORE DRINKS ON ONE OCCASION: NEVER
HOW OFTEN DO YOU HAVE A DRINK CONTAINING ALCOHOL: NEVER
SKIP TO QUESTIONS 9-10: 1
HOW MANY STANDARD DRINKS CONTAINING ALCOHOL DO YOU HAVE ON A TYPICAL DAY: PATIENT DOES NOT DRINK
AUDIT-C TOTAL SCORE: 0

## 2023-03-24 ASSESSMENT — SOCIAL DETERMINANTS OF HEALTH (SDOH)
HOW OFTEN DO YOU GET TOGETHER WITH FRIENDS OR RELATIVES?: NEVER
HOW HARD IS IT FOR YOU TO PAY FOR THE VERY BASICS LIKE FOOD, HOUSING, MEDICAL CARE, AND HEATING?: NOT HARD AT ALL
IN A TYPICAL WEEK, HOW MANY TIMES DO YOU TALK ON THE PHONE WITH FAMILY, FRIENDS, OR NEIGHBORS?: ONCE A WEEK
HOW OFTEN DO YOU GET TOGETHER WITH FRIENDS OR RELATIVES?: NEVER
HOW OFTEN DO YOU ATTENT MEETINGS OF THE CLUB OR ORGANIZATION YOU BELONG TO?: NEVER
HOW OFTEN DO YOU ATTEND CHURCH OR RELIGIOUS SERVICES?: NEVER
DO YOU BELONG TO ANY CLUBS OR ORGANIZATIONS SUCH AS CHURCH GROUPS UNIONS, FRATERNAL OR ATHLETIC GROUPS, OR SCHOOL GROUPS?: NO
HOW OFTEN DO YOU HAVE SIX OR MORE DRINKS ON ONE OCCASION: NEVER
HOW OFTEN DO YOU ATTEND CHURCH OR RELIGIOUS SERVICES?: NEVER
HOW OFTEN DO YOU HAVE A DRINK CONTAINING ALCOHOL: NEVER
DO YOU BELONG TO ANY CLUBS OR ORGANIZATIONS SUCH AS CHURCH GROUPS UNIONS, FRATERNAL OR ATHLETIC GROUPS, OR SCHOOL GROUPS?: NO
IN A TYPICAL WEEK, HOW MANY TIMES DO YOU TALK ON THE PHONE WITH FAMILY, FRIENDS, OR NEIGHBORS?: ONCE A WEEK
HOW OFTEN DO YOU ATTENT MEETINGS OF THE CLUB OR ORGANIZATION YOU BELONG TO?: NEVER
HOW MANY DRINKS CONTAINING ALCOHOL DO YOU HAVE ON A TYPICAL DAY WHEN YOU ARE DRINKING: PATIENT DOES NOT DRINK
WITHIN THE PAST 12 MONTHS, YOU WORRIED THAT YOUR FOOD WOULD RUN OUT BEFORE YOU GOT THE MONEY TO BUY MORE: PATIENT DECLINED

## 2023-03-27 ENCOUNTER — OFFICE VISIT (OUTPATIENT)
Dept: INTERNAL MEDICINE | Facility: OTHER | Age: 58
End: 2023-03-27
Payer: COMMERCIAL

## 2023-03-27 VITALS
OXYGEN SATURATION: 98 % | DIASTOLIC BLOOD PRESSURE: 88 MMHG | HEIGHT: 58 IN | HEART RATE: 64 BPM | SYSTOLIC BLOOD PRESSURE: 128 MMHG | TEMPERATURE: 97.6 F | WEIGHT: 134.2 LBS | BODY MASS INDEX: 28.17 KG/M2

## 2023-03-27 DIAGNOSIS — Z12.11 SCREEN FOR COLON CANCER: ICD-10-CM

## 2023-03-27 DIAGNOSIS — N28.1 COMPLEX RENAL CYST: ICD-10-CM

## 2023-03-27 DIAGNOSIS — R74.8 ELEVATED LIVER ENZYMES: ICD-10-CM

## 2023-03-27 DIAGNOSIS — E78.5 DYSLIPIDEMIA: ICD-10-CM

## 2023-03-27 PROBLEM — I10 PRIMARY HYPERTENSION: Status: RESOLVED | Noted: 2019-10-09 | Resolved: 2023-03-27

## 2023-03-27 PROCEDURE — 99213 OFFICE O/P EST LOW 20 MIN: CPT | Performed by: INTERNAL MEDICINE

## 2023-03-27 ASSESSMENT — ACTIVITIES OF DAILY LIVING (ADL): BATHING_REQUIRES_ASSISTANCE: 0

## 2023-03-27 ASSESSMENT — PATIENT HEALTH QUESTIONNAIRE - PHQ9: CLINICAL INTERPRETATION OF PHQ2 SCORE: 0

## 2023-03-27 ASSESSMENT — ENCOUNTER SYMPTOMS: GENERAL WELL-BEING: GOOD

## 2023-03-27 ASSESSMENT — FIBROSIS 4 INDEX: FIB4 SCORE: 1.38

## 2023-03-28 NOTE — PROGRESS NOTES
date  Chief Complaint   Patient presents with    Lab Results       HISTORY OF PRESENT ILLNESS: Patient is a 58 y.o. female established patient who presents today for the following.      1. Complex renal cyst  Patient had ultrasound done and found to have complex cyst.  Denies having any pain issues at this time in the flank.    2. Dyslipidemia  Known history of high cholesterol but has looked on her diet and exercise lost some weight and feeling good.  Continues to work on lowering cholesterol by herself without medication    3. Elevated liver enzymes  Rechecked labs and here for follow-up    4. Screen for colon cancer  Patient had done colonoscopy 10 years ago and due for 1 agreeing to do it again      Past Medical History:   Diagnosis Date    Abnormal vaginal Pap smear     Dyslipidemia     Elevated alkaline phosphatase level     Fibrocystic breast disease     GERD (gastroesophageal reflux disease)     Glucose intolerance (impaired glucose tolerance)     Hair loss     Heart murmur     Pap smear, as part of routine gynecological examination     Preventative health care     Urge incontinence     Vitamin D deficiency        Patient Active Problem List    Diagnosis Date Noted    Complex renal cyst 03/27/2023    Dizziness 12/12/2022    Abnormal vaginal bleeding in postmenopausal patient 10/12/2021    Vitamin D deficiency 09/20/2017    Dyslipidemia 09/20/2017    Cracked nails 09/20/2017    Elevated liver enzymes 06/03/2016       Allergies:Patient has no known allergies.    Current Outpatient Medications   Medication Sig Dispense Refill    psyllium (METAMUCIL) 58.12 % Pack Take 1 Packet by mouth every day.      ferrous sulfate 325 (65 Fe) MG tablet Take 325 mg by mouth every day.      cyanocobalamin (VITAMIN B-12) 100 MCG Tab Take 100 mcg by mouth every day.      Calcium Carbonate (CALCIUM 500 PO) Take  by mouth.      vitamin D (CHOLECALCIFEROL) 1000 UNIT Tab Take 1,000 Units by mouth every day.       No current  "facility-administered medications for this visit.       Social History     Tobacco Use    Smoking status: Never     Passive exposure: Yes    Smokeless tobacco: Never   Vaping Use    Vaping Use: Never used   Substance Use Topics    Alcohol use: No    Drug use: No       Family History   Problem Relation Age of Onset    Cancer Paternal Aunt         Breast CA         Review of Systems   Patient denies Fevers/chills/nausea/vomiting/chest pain/sob/blood in stools/black stools/blood in urine.all other systems are reviewed See HPI    Exam:  /88 (BP Location: Left arm, Patient Position: Sitting, BP Cuff Size: Adult)   Pulse 64   Temp 36.4 °C (97.6 °F) (Temporal)   Ht 1.461 m (4' 9.5\")   Wt 60.9 kg (134 lb 3.2 oz)   SpO2 98%  Body mass index is 28.54 kg/m².  Constitutional:  NAD, well appearing.  HEENT:   NC/AT  Cardiovascular: RRR.   No m/r/g. No carotid bruits.       Lungs:   CTAB, no w/r/r, no respiratory distress.  Abdomen: Soft, NT/ND + BS, no masses, no suprapubic tenderness, no hepatomegaly.  Extremities:  2+ DP and radial pulses bilaterally.  No c/c/e.  Skin:  Warm and dry.    Neurologic: Alert & oriented x 3, CN II-XII grossly intact, strength and sensation grossly intact.  No focal deficits noted.  Psychiatric:  Affect normal, mood normal, judgment normal.    Assessment/Plan:     1. Complex renal cyst  Reviewed ultrasound report and scheduling for MRI abdomen to evaluate the renal cyst.  - MR-ABDOMEN-WITH & W/O; Future    2. Dyslipidemia  ASCVD risk of 3.4% and not worrisome to start a statin.  Continue diet and exercise    3. Elevated liver enzymes  Liver enzymes normalized and doing well.  Hepatitis panel negative for infection .continue healthy diet and the abnormality likely from fatty liver    4. Screen for colon cancer  Referral for GI sent.  - Referral to GI for Colonoscopy      All imaging results and lab results and consult notes are reviewed at this visit.  Followup: Return in about 6 weeks " (around 5/8/2023) for pap smear.    Please note that this dictation was created using voice recognition software. I have made every reasonable attempt to correct obvious errors, but I expect that there are errors of grammar and possibly content that I did not discover before finalizing the note.

## 2023-04-18 ENCOUNTER — APPOINTMENT (OUTPATIENT)
Dept: RADIOLOGY | Facility: MEDICAL CENTER | Age: 58
End: 2023-04-18
Attending: INTERNAL MEDICINE
Payer: COMMERCIAL

## 2023-04-18 DIAGNOSIS — N28.1 COMPLEX RENAL CYST: ICD-10-CM

## 2023-04-18 PROCEDURE — 700117 HCHG RX CONTRAST REV CODE 255

## 2023-04-18 PROCEDURE — 74183 MRI ABD W/O CNTR FLWD CNTR: CPT

## 2023-04-18 PROCEDURE — A9579 GAD-BASE MR CONTRAST NOS,1ML: HCPCS

## 2023-04-18 RX ADMIN — GADOTERIDOL 15 ML: 279.3 INJECTION, SOLUTION INTRAVENOUS at 14:10

## 2023-04-19 ENCOUNTER — TELEPHONE (OUTPATIENT)
Dept: INTERNAL MEDICINE | Facility: OTHER | Age: 58
End: 2023-04-19
Payer: COMMERCIAL

## 2023-05-08 ENCOUNTER — OFFICE VISIT (OUTPATIENT)
Dept: INTERNAL MEDICINE | Facility: OTHER | Age: 58
End: 2023-05-08
Payer: COMMERCIAL

## 2023-05-08 VITALS
SYSTOLIC BLOOD PRESSURE: 152 MMHG | OXYGEN SATURATION: 99 % | TEMPERATURE: 97.6 F | HEART RATE: 58 BPM | DIASTOLIC BLOOD PRESSURE: 65 MMHG | WEIGHT: 138.4 LBS | HEIGHT: 58 IN | BODY MASS INDEX: 29.05 KG/M2

## 2023-05-08 DIAGNOSIS — R03.0 ELEVATED BP WITHOUT DIAGNOSIS OF HYPERTENSION: ICD-10-CM

## 2023-05-08 DIAGNOSIS — N72 CERVICITIS: ICD-10-CM

## 2023-05-08 DIAGNOSIS — N95.0 ABNORMAL VAGINAL BLEEDING IN POSTMENOPAUSAL PATIENT: ICD-10-CM

## 2023-05-08 DIAGNOSIS — D25.1 INTRAMURAL LEIOMYOMA OF UTERUS: ICD-10-CM

## 2023-05-08 DIAGNOSIS — Z12.4 ENCOUNTER FOR PAPANICOLAOU SMEAR FOR CERVICAL CANCER SCREENING: ICD-10-CM

## 2023-05-08 PROCEDURE — 99213 OFFICE O/P EST LOW 20 MIN: CPT | Performed by: INTERNAL MEDICINE

## 2023-05-08 ASSESSMENT — FIBROSIS 4 INDEX: FIB4 SCORE: 1.38

## 2023-05-09 NOTE — PROGRESS NOTES
date  Chief Complaint   Patient presents with    Gynecologic Exam       HISTORY OF PRESENT ILLNESS: Patient is a 58 y.o. female established patient who presents today for the following.      1. Abnormal vaginal bleeding in postmenopausal patient  2. Encounter for Papanicolaou smear for cervical cancer screening  Patient here for Pap smear and states that she had 1 episode of little blood on her underwear last year and this happened again yesterday.  No major bleeding but stains and she did have ultrasound done in gynecology referral placed last year but patient got confused she thought she had seen a gynecologist who did an ultrasound and upon further questioning it seems to be only that she saw ultrasound technician and not really in the gynecologist office.  Denies having any abdominal pain weight loss issues  Per patient her last menstrual period was in her late 40s so she reached menopause before 48 years of age.    3. Cervicitis    4. Intramural leiomyoma of uterus  Patient had an ultrasound report of leiomyomas otherwise no other issues other than the bleeding    5. Elevated BP without diagnosis of hypertension  Not on medications but has elevated blood pressure today and she states that she had some salty food yesterday.  Denies headaches or dizziness or blurry vision.  Also gained 4 pounds      Past Medical History:   Diagnosis Date    Abnormal vaginal Pap smear     Dyslipidemia     Elevated alkaline phosphatase level     Fibrocystic breast disease     GERD (gastroesophageal reflux disease)     Glucose intolerance (impaired glucose tolerance)     Hair loss     Heart murmur     Pap smear, as part of routine gynecological examination     Preventative health care     Urge incontinence     Vitamin D deficiency        Patient Active Problem List    Diagnosis Date Noted    Cervicitis 05/08/2023    Intramural leiomyoma of uterus 05/08/2023    Elevated BP without diagnosis of hypertension 05/08/2023    Complex renal  "cyst 03/27/2023    Dizziness 12/12/2022    Abnormal vaginal bleeding in postmenopausal patient 10/12/2021    Vitamin D deficiency 09/20/2017    Dyslipidemia 09/20/2017    Cracked nails 09/20/2017    Elevated liver enzymes 06/03/2016       Allergies:Patient has no known allergies.    Current Outpatient Medications   Medication Sig Dispense Refill    psyllium (METAMUCIL) 58.12 % Pack Take 1 Packet by mouth every day.      ferrous sulfate 325 (65 Fe) MG tablet Take 325 mg by mouth every day.      cyanocobalamin (VITAMIN B-12) 100 MCG Tab Take 100 mcg by mouth every day.      Calcium Carbonate (CALCIUM 500 PO) Take  by mouth.      vitamin D (CHOLECALCIFEROL) 1000 UNIT Tab Take 1,000 Units by mouth every day.       No current facility-administered medications for this visit.       Social History     Tobacco Use    Smoking status: Never     Passive exposure: Yes    Smokeless tobacco: Never   Vaping Use    Vaping Use: Never used   Substance Use Topics    Alcohol use: No    Drug use: No       Family History   Problem Relation Age of Onset    Cancer Paternal Aunt         Breast CA         Review of Systems   Patient denies Fevers/chills/nausea/vomiting/chest pain/sob/blood in stools/black stools/blood in urine.all other systems are reviewed See HPI    Exam:  BP (!) 152/65 (BP Location: Left arm, Patient Position: Sitting, BP Cuff Size: Small adult)   Pulse (!) 58   Temp 36.4 °C (97.6 °F) (Temporal)   Ht 1.461 m (4' 9.5\")   Wt 62.8 kg (138 lb 6.4 oz)   SpO2 99%  Body mass index is 29.43 kg/m².  Constitutional:  NAD, well appearing.  HEENT:   NC/AT  Cardiovascular: RRR.   No m/r/g. No carotid bruits.       Lungs:   CTAB, no w/r/r, no respiratory distress.  Abdomen: Soft, NT/ND + BS, no masses, no suprapubic tenderness, no hepatomegaly.  Extremities:  2+ DP and radial pulses bilaterally.  No c/c/e.  Skin:  Warm and dry.    Pelvic exam-external genitalia within normal limits.  Speculum exam shows vaginal walls within " normal.  Cervix is anteverted but has some erosions and slight inflammation.  Neurologic: Alert & oriented x 3, CN II-XII grossly intact, strength and sensation grossly intact.  No focal deficits noted.  Psychiatric:  Affect normal, mood normal, judgment normal.    Assessment/Plan:     1. Abnormal vaginal bleeding in postmenopausal   3. Cervicitis  Given her history with some spotting we advised that she needs to see a gynecologist for further evaluation.  - Referral to Gynecology    2. Encounter for Papanicolaou smear for cervical cancer screening  Pap smear completed today in the office and she did have abnormal Pap smear in the past which was in 2017  - THINPREP PAP WITH HPV; Future    4. Intramural leiomyoma of uterus  Currently no major concerns but with the spotting issues advised to see gynecologist   - Referral to Gynecology    5. Elevated BP without diagnosis of hypertension  Blood pressure running high and advised to lower salt intake and home blood pressure monitoring and reconsider medications which we will reassess in the next visit.  Patient wants to lose weight and stay away from more medication at this time      All imaging results and lab results and consult notes are reviewed at this visit.  Followup: Return in about 3 months (around 8/8/2023).    Please note that this dictation was created using voice recognition software. I have made every reasonable attempt to correct obvious errors, but I expect that there are errors of grammar and possibly content that I did not discover before finalizing the note.

## 2023-05-12 DIAGNOSIS — Z12.4 ENCOUNTER FOR PAPANICOLAOU SMEAR FOR CERVICAL CANCER SCREENING: ICD-10-CM

## 2023-06-13 ENCOUNTER — GYNECOLOGY VISIT (OUTPATIENT)
Dept: OBGYN | Facility: CLINIC | Age: 58
End: 2023-06-13
Payer: COMMERCIAL

## 2023-06-13 VITALS
SYSTOLIC BLOOD PRESSURE: 113 MMHG | WEIGHT: 135.4 LBS | DIASTOLIC BLOOD PRESSURE: 69 MMHG | BODY MASS INDEX: 27.3 KG/M2 | HEIGHT: 59 IN

## 2023-06-13 DIAGNOSIS — N81.10 PROLAPSE OF ANTERIOR VAGINAL WALL: ICD-10-CM

## 2023-06-13 DIAGNOSIS — R39.15 URINARY URGENCY: ICD-10-CM

## 2023-06-13 DIAGNOSIS — N95.0 POSTMENOPAUSAL BLEEDING: Primary | ICD-10-CM

## 2023-06-13 PROCEDURE — 3074F SYST BP LT 130 MM HG: CPT | Performed by: OBSTETRICS & GYNECOLOGY

## 2023-06-13 PROCEDURE — 3078F DIAST BP <80 MM HG: CPT | Performed by: OBSTETRICS & GYNECOLOGY

## 2023-06-13 PROCEDURE — 99203 OFFICE O/P NEW LOW 30 MIN: CPT | Performed by: OBSTETRICS & GYNECOLOGY

## 2023-06-13 ASSESSMENT — FIBROSIS 4 INDEX: FIB4 SCORE: 1.38

## 2023-06-13 NOTE — PROGRESS NOTES
Problem visit for bleeding.  CC:  Gynecologic Exam (Postmeno bleeding)       HPI: Patient is a 58 y.o. year old  who complains of postmenopausal bleeding.  She went through menopause between ages 43 and 48.  She did not have any postmenopausal bleeding until about the last 3 years.  She had some bleeding off and on, so she had an ultrasound evaluation 10/26/2021.  Ultrasound showed several intramural and subserosal uterine fibroids measuring up to 7 mm.  No submucosal lesions were detected, and the endometrial echocomplex measured 0.4 cm.  The ovaries were normal.  She was told to follow-up with an OB/GYN, but she did not follow-up with OB/GYN.  Pap smear negative 2023.  She did not have any bleeding last year, then she started experiencing bleeding again in January.  She has bleeding when she wipes about 2-3 times per month.  It is very light and bright red.  She also complains about prolapse.  She also complains about urinary urgency.  She denies any incontinence issues, but when she has to use the bathroom, she is rushing to the bathroom and she is not able to hold it very long.  She also feels like she urinates about every 15 minutes.  She mostly drinks water and denies drinking any artificial sweeteners, caffeine, citrus, or alcohol.  She sometimes has pain with sex, but denies any other vaginal discharge or irritation.    Social History     Substance and Sexual Activity   Sexual Activity Not Currently    Partners: Male    Birth control/protection: Post-Menopausal    Comment:        Hx Dysplasia : none  Hx STD : none       ALLERGIES / REACTIONS:  No Known Allergies                      SOCIAL HISTORY:   reports that she has never smoked. She has been exposed to tobacco smoke. She has never used smokeless tobacco. She reports that she does not drink alcohol and does not use drugs.  Social History     Socioeconomic History    Marital status:      Spouse name: Not on file    Number of  children: Not on file    Years of education: Not on file    Highest education level: Not on file   Occupational History    Not on file   Tobacco Use    Smoking status: Never     Passive exposure: Yes    Smokeless tobacco: Never   Vaping Use    Vaping Use: Never used   Substance and Sexual Activity    Alcohol use: No    Drug use: No    Sexual activity: Not Currently     Partners: Male     Birth control/protection: Post-Menopausal     Comment:    Other Topics Concern    Not on file   Social History Narrative    Not on file     Social Determinants of Health     Financial Resource Strain: Low Risk  (3/24/2023)    Overall Financial Resource Strain (CARDIA)     Difficulty of Paying Living Expenses: Not hard at all   Food Insecurity: Unknown (3/24/2023)    Hunger Vital Sign     Worried About Running Out of Food in the Last Year: Patient refused     Ran Out of Food in the Last Year: Patient refused   Transportation Needs: Unknown (3/24/2023)    PRAPARE - Transportation     Lack of Transportation (Medical): Patient refused     Lack of Transportation (Non-Medical): Patient refused   Physical Activity: Sufficiently Active (3/24/2023)    Exercise Vital Sign     Days of Exercise per Week: 5 days     Minutes of Exercise per Session: 40 min   Stress: No Stress Concern Present (3/24/2023)    [employer] [place] [employer] - Occupational Stress Questionnaire     Feeling of Stress : Only a little   Social Connections: Socially Isolated (3/24/2023)    Social Connection and Isolation Panel [NHANES]     Frequency of Communication with Friends and Family: Once a week     Frequency of Social Gatherings with Friends and Family: Never     Attends [employer] Services: Never     Active Member of Clubs or Organizations: No     Attends Club or Organization Meetings: Never     Marital Status:    Intimate Partner Violence: Not on file   Housing Stability: Unknown (3/24/2023)    Housing Stability Vital Sign     Unable to Pay  "for Housing in the Last Year: Patient refused     Number of Places Lived in the Last Year: Not on file     Unstable Housing in the Last Year: Patient refused         OBSTETRIC HISTORY:  OB History    Para Term  AB Living   2 2 2     2   SAB IAB Ectopic Molar Multiple Live Births             2      # Outcome Date GA Lbr [place]/2nd Weight Sex Delivery Anes PTL Lv   2 Term 95 40w0d  6 lb M    SOFI   1 Term 91 40w0d  6 lb M    SOFI       MEDICAL HISTORY:  Past Medical History:   Diagnosis Date    Abnormal vaginal Pap smear     Dyslipidemia     Elevated alkaline phosphatase level     Fibrocystic breast disease     GERD (gastroesophageal reflux disease)     Glucose intolerance (impaired glucose tolerance)     Hair loss     Heart murmur     Pap smear, as part of routine gynecological examination     Preventative health care     Urge incontinence     Vitamin D deficiency        MEDICATIONS:  Current Outpatient Medications on File Prior to Visit   Medication Sig Dispense Refill    ferrous sulfate 325 (65 Fe) MG tablet Take 325 mg by mouth every day.      cyanocobalamin (VITAMIN B-12) 100 MCG Tab Take 100 mcg by mouth every day.      Calcium Carbonate (CALCIUM 500 PO) Take  by mouth.      vitamin D (CHOLECALCIFEROL) 1000 UNIT Tab Take 1,000 Units by mouth every day.      psyllium (METAMUCIL) 58.12 % Pack Take 1 Packet by mouth every day. (Patient not taking: Reported on 2023)       No current facility-administered medications on file prior to visit.           FAMILY HISTORY:  Family History   Problem Relation Age of Onset    Hypertension Mother     Hypertension Father     Cancer Paternal Aunt         Breast CA       SURGICAL HISTORY:  History reviewed. No pertinent surgical history.    PHYSICAL EXAMINATION:  Vital Signs:   Vitals:    23 1035   BP: 113/69   BP Location: Left arm   Patient Position: Sitting   BP Cuff Size: Adult   Weight: 135 lb 6.4 oz   Height: 4' 11\"     Appearance/Psychiatric: " She does not appear anxious.  Constitutional: The patient is well nourished.  Neck: Neck appears symmetric.  Respiratory: non labored breathing  Breast: deferred  Abd: Soft, flat, non-tender  Pelvic: The vulva is normal. The urethra is normal.   There is stage 2 anterior prolapse on exam. Specifically, there is some tissue from the anterior wall sticking out. This could be excess tissue or scar tissue after repair of a vaginal delivery. Cervix appears normal, very minimal blood on cervix. Most likely blood is from vaginal atrophy and trauma from speculum insertion.     The uterus is of normal size, shape and contour. The left adnexa is normal. The right adnexa is normal. The perineum is normal.  Extremeties: Legs are symmetric and without tenderness.  Skin: No rash observed    10/25/2021 1:30 PM     HISTORY/REASON FOR EXAM:  Vaginal Bleeding     TECHNIQUE/EXAM DESCRIPTION:  Transabdominal and transvaginal pelvic ultrasound.     COMPARISON:   None     FINDINGS:  Both transabdominal and transvaginal scanning were performed to optimally visualize the pelvis.     The uterus measures 5.8 x 2.9 x 4 cm.  The uterine myometrium is inhomogeneous. There are several subcentimeter uterine fibroids measuring up to 7 mm. They are intramural/subserosal. No submucosal lesions detected.  The endometrial echo complex measures 0.4 cm.     Low resistive waveforms are confirmed within the ovaries.  The right ovary measures 2.2 x 1.8 x 1.1 cm.     The left ovary measures 1.3 x 1.9 x 0.8 cm.     There is no free fluid seen.     IMPRESSION:     1.  Inhomogeneous uterus, with several subcentimeter intramural uterine fibroids.  2.  Normal endometrial thickness.  3.  Normal ovaries.    ASSESSMENT AND PLAN:  58 y.o.  complaining of postmenopausal bleeding    Valeria was seen today for gynecologic exam.    Diagnoses and all orders for this visit:    Postmenopausal bleeding  - Recommend transvaginal US for evaluation. May need biopsy, but  needs TVUS first.  -     US-PELVIC COMPLETE (TRANSABDOMINAL/TRANSVAGINAL) (COMBO); Future    Prolapse of anterior vaginal wall  - Discussed options such as expectant management, pessary, physical therapy, or surgery.  She would like to try physical therapy first.  -     Referral to Physical Therapy    Urinary urgency  - she already does not drink any bladder irritants, per patient she just drinks water  - discussed that medication may be an option, but she would like to try physical therapy first  -     Referral to Physical Therapy    Return: Return in 2-3 weeks to discuss US results.    Tesha Potter M.D.  6/13/2023

## 2023-07-11 ENCOUNTER — APPOINTMENT (OUTPATIENT)
Dept: RADIOLOGY | Facility: MEDICAL CENTER | Age: 58
End: 2023-07-11
Attending: OBSTETRICS & GYNECOLOGY
Payer: COMMERCIAL

## 2023-07-25 ENCOUNTER — APPOINTMENT (OUTPATIENT)
Dept: OBGYN | Facility: CLINIC | Age: 58
End: 2023-07-25
Payer: COMMERCIAL

## 2023-08-01 ENCOUNTER — HOSPITAL ENCOUNTER (OUTPATIENT)
Dept: RADIOLOGY | Facility: MEDICAL CENTER | Age: 58
End: 2023-08-01
Attending: OBSTETRICS & GYNECOLOGY
Payer: COMMERCIAL

## 2023-08-01 DIAGNOSIS — N95.0 POSTMENOPAUSAL BLEEDING: ICD-10-CM

## 2023-08-01 PROCEDURE — 76830 TRANSVAGINAL US NON-OB: CPT

## 2023-08-29 ENCOUNTER — TELEPHONE (OUTPATIENT)
Dept: OBGYN | Facility: CLINIC | Age: 58
End: 2023-08-29
Payer: COMMERCIAL

## 2023-08-29 NOTE — TELEPHONE ENCOUNTER
----- Message from Tesha Potter M.D. sent at 8/27/2023  3:22 PM PDT -----  I think she cancelled her follow up appointment to discuss. I think we should do a hysteroscopy D&C to evaluate her uterine lining since she has been having the bleeding. Another option would be an endometrial biopsy in the office. I think it would be best to evaluate her uterine lining, though. Please let her know and see if she can follow up with me. Thanks.        Called pt and informed as above. Offered pt to fit her in on 9/5/2023 at 1545 pt declined and satted she can only do Tuesdays or Wednesdays in the morning around 0900. Explained to pt the next available appt will be in Oct. Pt stated that was ok. Pt scheduled for 10/10/2023 at 0900.  Pt agreed and verbalized understanding.

## 2023-10-10 ENCOUNTER — GYNECOLOGY VISIT (OUTPATIENT)
Dept: OBGYN | Facility: CLINIC | Age: 58
End: 2023-10-10
Payer: COMMERCIAL

## 2023-10-10 VITALS
SYSTOLIC BLOOD PRESSURE: 139 MMHG | HEIGHT: 59 IN | WEIGHT: 142 LBS | DIASTOLIC BLOOD PRESSURE: 77 MMHG | BODY MASS INDEX: 28.63 KG/M2

## 2023-10-10 DIAGNOSIS — N95.0 POSTMENOPAUSAL BLEEDING: Primary | ICD-10-CM

## 2023-10-10 PROCEDURE — 99213 OFFICE O/P EST LOW 20 MIN: CPT | Performed by: OBSTETRICS & GYNECOLOGY

## 2023-10-10 PROCEDURE — 3075F SYST BP GE 130 - 139MM HG: CPT | Performed by: OBSTETRICS & GYNECOLOGY

## 2023-10-10 PROCEDURE — RXMED WILLOW AMBULATORY MEDICATION CHARGE: Performed by: INTERNAL MEDICINE

## 2023-10-10 PROCEDURE — 3078F DIAST BP <80 MM HG: CPT | Performed by: OBSTETRICS & GYNECOLOGY

## 2023-10-10 ASSESSMENT — FIBROSIS 4 INDEX: FIB4 SCORE: 1.38

## 2023-10-10 NOTE — PROGRESS NOTES
Patient here for GYN exam.  [facility] lab results  Pt states she doesn't have any complaints  Pharmacy verified

## 2023-10-11 NOTE — PROGRESS NOTES
Problem visit.  CC:  Gynecologic Exam (Lab result follow up )       HPI: Patient is a 58 y.o. year old  who presents as a follow-up for postmenopausal bleeding.  She has not had any bleeding since her appointment in .  She had a transvaginal ultrasound on 2023 and it showed that she had multiple fibroids including a 0.5 cm fibroid in the anterior uterine wall and additional 6 mm fibroid in the posterior wall.  There was also a 7 mm fibroid in the lower uterine body.  The endometrial thickness was 0.5 cm.  She has had some postmenopausal bleeding in  too.     Social History     Substance and Sexual Activity   Sexual Activity Not Currently    Partners: Male    Birth control/protection: Post-Menopausal    Comment:        Last pap: 2023  Hx Dysplasia : none       ALLERGIES / REACTIONS:  No Known Allergies                      SOCIAL HISTORY:   reports that she has never smoked. She has been exposed to tobacco smoke. She has never used smokeless tobacco. She reports that she does not drink alcohol and does not use drugs.  Social History     Socioeconomic History    Marital status:      Spouse name: Not on file    Number of children: Not on file    Years of education: Not on file    Highest education level: Not on file   Occupational History    Not on file   Tobacco Use    Smoking status: Never     Passive exposure: Yes    Smokeless tobacco: Never   Vaping Use    Vaping Use: Never used   Substance and Sexual Activity    Alcohol use: No    Drug use: No    Sexual activity: Not Currently     Partners: Male     Birth control/protection: Post-Menopausal     Comment:    Other Topics Concern    Not on file   Social History Narrative    Not on file     Social Determinants of Health     Financial Resource Strain: Low Risk  (3/24/2023)    Overall Financial Resource Strain (CARDIA)     Difficulty of Paying Living Expenses: Not hard at all   Food Insecurity: Unknown (3/24/2023)    Hunger  Vital Sign     Worried About Running Out of Food in the Last Year: Patient refused     Ran Out of Food in the Last Year: Patient refused   Transportation Needs: Unknown (3/24/2023)    PRAPARE - Transportation     Lack of Transportation (Medical): Patient refused     Lack of Transportation (Non-Medical): Patient refused   Physical Activity: Sufficiently Active (3/24/2023)    Exercise Vital Sign     Days of Exercise per Week: 5 days     Minutes of Exercise per Session: 40 min   Stress: No Stress Concern Present (3/24/2023)    [employer] [place] [employer] - Occupational Stress Questionnaire     Feeling of Stress : Only a little   Social Connections: Socially Isolated (3/24/2023)    Social Connection and Isolation Panel [NHANES]     Frequency of Communication with Friends and Family: Once a week     Frequency of Social Gatherings with Friends and Family: Never     Attends Buddhist Services: Never     Active Member of Clubs or Organizations: No     Attends Club or Organization Meetings: Never     Marital Status:    Intimate Partner Violence: Not on file   Housing Stability: Unknown (3/24/2023)    Housing Stability Vital Sign     Unable to Pay for Housing in the Last Year: Patient refused     Number of Places Lived in the Last Year: Not on file     Unstable Housing in the Last Year: Patient refused         OBSTETRIC HISTORY:  OB History    Para Term  AB Living   2 2 2     2   SAB IAB Ectopic Molar Multiple Live Births             2      # Outcome Date GA Lbr [place]/2nd Weight Sex Delivery Anes PTL Lv   2 Term 95 40w0d  6 lb M    SOFI   1 Term 91 40w0d  6 lb M    SOFI       MEDICAL HISTORY:  Past Medical History:   Diagnosis Date    Abnormal vaginal Pap smear     Dyslipidemia     Elevated alkaline phosphatase level     Fibrocystic breast disease     GERD (gastroesophageal reflux disease)     Glucose intolerance (impaired glucose tolerance)     Hair loss     Heart murmur     Pap  "smear, as part of routine gynecological examination     Preventative health care     Urge incontinence     Vitamin D deficiency        MEDICATIONS:  Current Outpatient Medications on File Prior to Visit   Medication Sig Dispense Refill    ferrous sulfate 325 (65 Fe) MG tablet Take 325 mg by mouth every day.      cyanocobalamin (VITAMIN B-12) 100 MCG Tab Take 100 mcg by mouth every day.      Calcium Carbonate (CALCIUM 500 PO) Take  by mouth.      vitamin D (CHOLECALCIFEROL) 1000 UNIT Tab Take 1,000 Units by mouth every day.      psyllium (METAMUCIL) 58.12 % Pack Take 1 Packet by mouth every day. (Patient not taking: Reported on 6/13/2023)       No current facility-administered medications on file prior to visit.           FAMILY HISTORY:  Family History   Problem Relation Age of Onset    Hypertension Mother     Hypertension Father     Cancer Paternal Aunt         Breast CA       SURGICAL HISTORY:  History reviewed. No pertinent surgical history.    PHYSICAL EXAMINATION:  Vital Signs:   Vitals:    10/10/23 0904   BP: 139/77   BP Location: Left arm   Patient Position: Sitting   BP Cuff Size: Adult   Weight: 142 lb   Height: 4' 11\"     Appearance/Psychiatric: She does not appear anxious.  Constitutional: The patient is well nourished.  Neck: Neck appears symmetric.  Respiratory: Non labored breathing  Breast: deferred  Abd: Soft, flat, non-tender  Pelvic: deferred  Extremeties: Legs are symmetric and without tenderness.  Skin: No rash observed.    8/1/2023 12:28 PM     HISTORY/REASON FOR EXAM:  Vaginal Bleeding; postmenopausal bleeding        TECHNIQUE/EXAM DESCRIPTION:  Transabdominal and transvaginal pelvic ultrasound.     COMPARISON:   10/25/2021     FINDINGS:  Both transabdominal and transvaginal scanning were performed to optimally visualize the pelvis.     UTERUS:  The uterus measures 4.9 x 3.8 x 3.2 cm.  The uterine myometrium is inhomogeneous. Multiple fibroids. There is a 0.5 cm fibroid in the anterior uterine " wall. Additional 6 mm fibroid in the posterior wall. There is a 7 mm fibroid in the lower uterine body as well.  The endometrial echo complex measures 0.5 cm.  The endometrium is unremarkable in appearance and thickness for age and menstrual status.        OVARIES:  The right ovary measures 1.7 x 0.8 x 1.1 cm. Duplex Doppler examination of the right ovary shows normal waveforms.     The left ovary measures 1.6 x 1.0 x 1.5 cm. Duplex Doppler examination of the left ovary shows normal waveforms.           There is no free fluid seen.     IMPRESSION:     1.  Fibroid uterus. No thickening of the endometrium.     2.  Unremarkable bilateral ovaries.    ASSESSMENT AND PLAN:  58 y.o.  who presents for follow-up for postmenopausal bleeding    Diagnoses and all orders for this visit:    Postmenopausal bleeding  -I discussed ultrasound results with patient, and I spoke with her daughter on the telephone.  I discussed that since her endometrial thickness is 0.5 cm, I do recommend sampling of her endometrium by either an endometrial biopsy in the office or hysteroscopy D&C to evaluate for any precancerous lesion or cancerous lesion.  I discussed that hysteroscopy D&C is a surgical procedure requiring general anesthesia.  The advantages of hysteroscopy D&C include possibly removing a polyp and being able to take pictures inside her uterus.  The disadvantage of hysteroscopy D&C would be needing general anesthesia.  An advantage of an in office biopsy, would be sampling the endometrium without general anesthesia.  This procedure may be a little bit uncomfortable and I do recommend taking over-the-counter pain medication such as ibuprofen and Tylenol.  The cramping from the in office procedure should only last for short period of time.  If her endometrial biopsy in the office is negative, this is very reassuring that she does not have a precancerous or cancerous lesion inside her uterus.    Return: She will think about it and  communicate with us via [employer] about her decision.    Tesha Potter M.D.  10/11/2023

## 2023-10-12 ENCOUNTER — PHARMACY VISIT (OUTPATIENT)
Dept: PHARMACY | Facility: MEDICAL CENTER | Age: 58
End: 2023-10-12
Payer: COMMERCIAL

## 2024-05-22 ENCOUNTER — APPOINTMENT (OUTPATIENT)
Dept: INTERNAL MEDICINE | Facility: OTHER | Age: 59
End: 2024-05-22

## 2024-06-26 ENCOUNTER — APPOINTMENT (OUTPATIENT)
Dept: INTERNAL MEDICINE | Facility: OTHER | Age: 59
End: 2024-06-26

## 2024-06-26 VITALS
OXYGEN SATURATION: 98 % | BODY MASS INDEX: 27.78 KG/M2 | WEIGHT: 137.8 LBS | HEIGHT: 59 IN | HEART RATE: 67 BPM | DIASTOLIC BLOOD PRESSURE: 78 MMHG | TEMPERATURE: 97.9 F | SYSTOLIC BLOOD PRESSURE: 131 MMHG

## 2024-06-26 DIAGNOSIS — Z11.4 SCREENING FOR HIV (HUMAN IMMUNODEFICIENCY VIRUS): ICD-10-CM

## 2024-06-26 DIAGNOSIS — Z12.31 ENCOUNTER FOR SCREENING MAMMOGRAM FOR BREAST CANCER: ICD-10-CM

## 2024-06-26 DIAGNOSIS — Z12.12 SCREENING FOR COLORECTAL CANCER: ICD-10-CM

## 2024-06-26 DIAGNOSIS — N64.4 BREAST PAIN, LEFT: ICD-10-CM

## 2024-06-26 DIAGNOSIS — R73.02 GLUCOSE INTOLERANCE (IMPAIRED GLUCOSE TOLERANCE): ICD-10-CM

## 2024-06-26 DIAGNOSIS — Z12.11 SCREENING FOR COLORECTAL CANCER: ICD-10-CM

## 2024-06-26 DIAGNOSIS — E78.5 DYSLIPIDEMIA: ICD-10-CM

## 2024-06-26 DIAGNOSIS — M72.2 PLANTAR FASCIITIS, BILATERAL: ICD-10-CM

## 2024-06-26 PROBLEM — R03.0 ELEVATED BP WITHOUT DIAGNOSIS OF HYPERTENSION: Status: RESOLVED | Noted: 2023-05-08 | Resolved: 2024-06-26

## 2024-06-26 PROCEDURE — 99214 OFFICE O/P EST MOD 30 MIN: CPT | Performed by: INTERNAL MEDICINE

## 2024-06-26 PROCEDURE — 3078F DIAST BP <80 MM HG: CPT | Performed by: INTERNAL MEDICINE

## 2024-06-26 PROCEDURE — 3075F SYST BP GE 130 - 139MM HG: CPT | Performed by: INTERNAL MEDICINE

## 2024-06-26 ASSESSMENT — FIBROSIS 4 INDEX: FIB4 SCORE: 1.4

## 2024-06-27 NOTE — PROGRESS NOTES
date  Chief Complaint   Patient presents with    Foot Problem     - pain in feet - has some swelling   - l leg and breast is bigger than right side. Feels pain in L breast when she lays on it        HISTORY OF PRESENT ILLNESS: Patient is a 59 y.o. female established patient who presents today for the following.      1. Plantar fasciitis, bilateral  Patient complaining of pain in bilateral feet especially when she is moving and active.  She started using a bottle to roll her feet over and stretching which has been helping to some extent.  Not interested in pain medication    2. Screening for HIV (human immunodeficiency virus)  Patient due for HIV screening and low risk    3. Encounter for screening mammogram for breast cancer  Patient due for mammogram    4. Screening for colorectal cancer  Patient due for colon cancer screening    5. Glucose intolerance (impaired glucose tolerance)  Blood sugar slightly running high and A1c at 5.7 in the past denies polyuria polydipsia    6. Breast pain, left  Complaint of pain in the breast only when she is lying down on the left side.  Denies having any lumps or bumps.  She is also being active and she is concerned if it is a bra issue    7. Dyslipidemia  Not on statin and has high cholesterol.  Trying to avoid statins and watching her diet and exercise and lost some weight      Past Medical History:   Diagnosis Date    Abnormal vaginal Pap smear     Dyslipidemia     Elevated alkaline phosphatase level     Fibrocystic breast disease     GERD (gastroesophageal reflux disease)     Glucose intolerance (impaired glucose tolerance)     Hair loss     Heart murmur     Pap smear, as part of routine gynecological examination     Preventative health care     Urge incontinence     Vitamin D deficiency        Patient Active Problem List    Diagnosis Date Noted    Plantar fasciitis, bilateral 06/26/2024    Glucose intolerance (impaired glucose tolerance) 06/26/2024    Cervicitis 05/08/2023     "Intramural leiomyoma of uterus 05/08/2023    Complex renal cyst 03/27/2023    Dizziness 12/12/2022    Abnormal vaginal bleeding in postmenopausal patient 10/12/2021    Vitamin D deficiency 09/20/2017    Dyslipidemia 09/20/2017    Cracked nails 09/20/2017    Elevated liver enzymes 06/03/2016       Allergies:Patient has no known allergies.    Current Outpatient Medications   Medication Sig Dispense Refill    ferrous sulfate 325 (65 Fe) MG tablet Take 325 mg by mouth every day.      cyanocobalamin (VITAMIN B-12) 100 MCG Tab Take 100 mcg by mouth every day.      Calcium Carbonate (CALCIUM 500 PO) Take  by mouth.      vitamin D (CHOLECALCIFEROL) 1000 UNIT Tab Take 1,000 Units by mouth every day.       No current facility-administered medications for this visit.       Social History     Tobacco Use    Smoking status: Never     Passive exposure: Yes    Smokeless tobacco: Never   Vaping Use    Vaping status: Never Used   Substance Use Topics    Alcohol use: No    Drug use: No       Family History   Problem Relation Age of Onset    Hypertension Mother     Hypertension Father     Cancer Paternal Aunt         Breast CA         Review of Systems   Patient denies Fevers/chills/nausea/vomiting/chest pain/sob/blood in stools/black stools/blood in urine.all other systems are reviewed See HPI    Exam:  /78 (BP Location: Left arm, Patient Position: Sitting, BP Cuff Size: Adult)   Pulse 67   Temp 36.6 °C (97.9 °F) (Temporal)   Ht 1.499 m (4' 11\")   Wt 62.5 kg (137 lb 12.8 oz)   SpO2 98%  Body mass index is 27.83 kg/m².  Constitutional:  NAD, well appearing.  HEENT:   NC/AT  Cardiovascular: RRR.   No m/r/g. No carotid bruits.       Lungs:   CTAB, no w/r/r, no respiratory distress.  Abdomen: Soft, NT/ND + BS, no masses, no suprapubic tenderness, no hepatomegaly.  Extremities:  2+ DP and radial pulses bilaterally.  No c/c/e.  Skin:  Warm and dry.    Neurologic: Alert & oriented x 3, CN II-XII grossly intact, strength and " sensation grossly intact.  No focal deficits noted.  Psychiatric:  Affect normal, mood normal, judgment normal.    Assessment/Plan:     1. Plantar fasciitis, bilateral  Given her bilateral foot pain in the soles we discussed about cold bottle first thing in the morning and stretching while rolling on the bottle before she moves around.  Discussed about good arch support and podiatry but she is not keen on seeing a specialist at this time and not interested in pain medication    2. Screening for HIV (human immunodeficiency virus)    - HIV AG/AB COMBO ASSAY SCREENING; Future    3. Encounter for screening mammogram for breast cancer  Will wait for screening until the diagnostic mammogram is completed given her breast pain    4. Screening for colorectal cancer  Referral to GI placed  - Referral to GI for Colonoscopy    5. Glucose intolerance (impaired glucose tolerance)  In the past A1c at 5.7 so ordered A1c    6. Breast pain, right  Given the complaint of pain will do diagnostic mammogram  - MA DIAGNOSTIC MAMMO LEFT W/CAD; Future    7. Dyslipidemia  Continue diet exercise and check labs  - Comp Metabolic Panel; Future  - Lipid Profile; Future      All imaging results and lab results and consult notes are reviewed at this visit.  Followup: Return in about 2 months (around 8/26/2024).    Please note that this dictation was created using voice recognition software. I have made every reasonable attempt to correct obvious errors, but I expect that there are errors of grammar and possibly content that I did not discover before finalizing the note.

## 2024-07-16 ENCOUNTER — HOSPITAL ENCOUNTER (OUTPATIENT)
Dept: RADIOLOGY | Facility: MEDICAL CENTER | Age: 59
End: 2024-07-16
Attending: INTERNAL MEDICINE
Payer: COMMERCIAL

## 2024-07-16 DIAGNOSIS — N64.4 BREAST PAIN, LEFT: ICD-10-CM

## 2024-07-16 PROCEDURE — G0279 TOMOSYNTHESIS, MAMMO: HCPCS

## 2024-07-16 PROCEDURE — 76642 ULTRASOUND BREAST LIMITED: CPT | Mod: LT

## 2024-11-06 ENCOUNTER — APPOINTMENT (OUTPATIENT)
Dept: INTERNAL MEDICINE | Facility: OTHER | Age: 59
End: 2024-11-06
Payer: COMMERCIAL

## 2024-11-06 VITALS
WEIGHT: 131.6 LBS | BODY MASS INDEX: 26.53 KG/M2 | HEART RATE: 67 BPM | SYSTOLIC BLOOD PRESSURE: 147 MMHG | DIASTOLIC BLOOD PRESSURE: 75 MMHG | HEIGHT: 59 IN | TEMPERATURE: 98.5 F | OXYGEN SATURATION: 98 %

## 2024-11-06 DIAGNOSIS — R42 VERTIGO: ICD-10-CM

## 2024-11-06 DIAGNOSIS — Z23 NEED FOR VACCINATION: ICD-10-CM

## 2024-11-06 DIAGNOSIS — E78.5 DYSLIPIDEMIA: ICD-10-CM

## 2024-11-06 DIAGNOSIS — I10 PRIMARY HYPERTENSION: ICD-10-CM

## 2024-11-06 PROBLEM — N95.0 ABNORMAL VAGINAL BLEEDING IN POSTMENOPAUSAL PATIENT: Status: RESOLVED | Noted: 2021-10-12 | Resolved: 2024-11-06

## 2024-11-06 PROBLEM — N72 CERVICITIS: Status: RESOLVED | Noted: 2023-05-08 | Resolved: 2024-11-06

## 2024-11-06 PROCEDURE — 90656 IIV3 VACC NO PRSV 0.5 ML IM: CPT | Performed by: INTERNAL MEDICINE

## 2024-11-06 PROCEDURE — 3077F SYST BP >= 140 MM HG: CPT | Performed by: INTERNAL MEDICINE

## 2024-11-06 PROCEDURE — 3078F DIAST BP <80 MM HG: CPT | Performed by: INTERNAL MEDICINE

## 2024-11-06 PROCEDURE — 90471 IMMUNIZATION ADMIN: CPT | Performed by: INTERNAL MEDICINE

## 2024-11-06 PROCEDURE — 99214 OFFICE O/P EST MOD 30 MIN: CPT | Mod: 25 | Performed by: INTERNAL MEDICINE

## 2024-11-06 RX ORDER — POLYETHYLENE GLYCOL-3350 AND ELECTROLYTES 236; 6.74; 5.86; 2.97; 22.74 G/274.31G; G/274.31G; G/274.31G; G/274.31G; G/274.31G
240 POWDER, FOR SOLUTION ORAL ONCE
COMMUNITY
Start: 2024-10-22

## 2024-11-06 RX ORDER — MECLIZINE HYDROCHLORIDE 25 MG/1
25 TABLET ORAL 3 TIMES DAILY PRN
COMMUNITY
Start: 2024-10-22

## 2024-11-06 RX ORDER — LOSARTAN POTASSIUM 50 MG/1
50 TABLET ORAL DAILY
Qty: 30 TABLET | Refills: 3 | Status: SHIPPED | OUTPATIENT
Start: 2024-11-06

## 2024-11-06 ASSESSMENT — FIBROSIS 4 INDEX: FIB4 SCORE: 1.44

## 2024-11-06 ASSESSMENT — PATIENT HEALTH QUESTIONNAIRE - PHQ9: CLINICAL INTERPRETATION OF PHQ2 SCORE: 0

## 2024-11-06 NOTE — PROGRESS NOTES
date  Chief Complaint   Patient presents with    Follow-Up     - reports she has not been taking any medications for the past 6 months     Hospital Follow-up     - went to [facility] ER for vertigo - BP was very high in ER        HISTORY OF PRESENT ILLNESS: Patient is a 59 y.o. female established patient who presents today for the following.      1. Vertigo  Patient had a recent hospital visit more than about a secondary to vertigo episode and was given some meclizine and physical therapy referral.  Currently has no episodes of dizziness but would like a formal referral for vestibular therapy    2. Primary hypertension  Also noticed elevated blood pressures at home systolic between 140s to 150s and has not started any medication.  Has tried exercise and low-salt diet but not much improvement and ready for medication now    3. Dyslipidemia  Patient is not interested in statin and wants to continue diet and exercise.  She does have an upcoming trip to [facility] for a wedding and then would like to reassess labs    4. Need for vaccination  Interested in getting her flu shot today      Past Medical History:   Diagnosis Date    Abnormal vaginal bleeding in postmenopausal patient 10/12/2021    Seen Gyn and no issues      Abnormal vaginal Pap smear     Cervicitis 05/08/2023    Dyslipidemia     Elevated alkaline phosphatase level     Elevated liver enzymes 06/03/2016    TRANSAMINASES, SERUM, ELEVATED       Fibrocystic breast disease     GERD (gastroesophageal reflux disease)     Glucose intolerance (impaired glucose tolerance)     Hair loss     Heart murmur     Pap smear, as part of routine gynecological examination     Preventative health care     Urge incontinence     Vitamin D deficiency        Patient Active Problem List    Diagnosis Date Noted    Plantar fasciitis, bilateral 06/26/2024    Glucose intolerance (impaired glucose tolerance) 06/26/2024    Intramural leiomyoma of uterus 05/08/2023    Complex renal cyst  "03/27/2023    Vertigo 12/12/2022    Primary hypertension 10/09/2019    Vitamin D deficiency 09/20/2017    Dyslipidemia 09/20/2017    Cracked nails 09/20/2017       Allergies:Patient has no known allergies.    Current Outpatient Medications   Medication Sig Dispense Refill    meclizine (ANTIVERT) 25 MG Tab Take 25 mg by mouth 3 times a day as needed.      GAVILYTE-G 236 g Recon Soln Take 240 mL by mouth one time.      losartan (COZAAR) 50 MG Tab Take 1 Tablet by mouth every day. 30 Tablet 3    cyanocobalamin (VITAMIN B-12) 100 MCG Tab Take 100 mcg by mouth every day.      Calcium Carbonate (CALCIUM 500 PO) Take  by mouth.      vitamin D (CHOLECALCIFEROL) 1000 UNIT Tab Take 1,000 Units by mouth every day.       No current facility-administered medications for this visit.       Social History     Tobacco Use    Smoking status: Never     Passive exposure: Yes    Smokeless tobacco: Never   Vaping Use    Vaping status: Never Used   Substance Use Topics    Alcohol use: No    Drug use: No       Family History   Problem Relation Age of Onset    Hypertension Mother     Hypertension Father     Cancer Paternal Aunt         Breast CA         Review of Systems   Patient denies Fevers/chills/nausea/vomiting/chest pain/sob/blood in stools/black stools/blood in urine.all other systems are reviewed See HPI    Exam:  BP (!) 147/75 (BP Location: Left arm, Patient Position: Sitting, BP Cuff Size: Adult)   Pulse 67   Temp 36.9 °C (98.5 °F) (Temporal)   Ht 1.499 m (4' 11\")   Wt 59.7 kg (131 lb 9.6 oz)   SpO2 98%  Body mass index is 26.58 kg/m².  Constitutional:  NAD, well appearing.  HEENT:   NC/AT  Cardiovascular: RRR.   No m/r/g. No carotid bruits.       Lungs:   CTAB, no w/r/r, no respiratory distress.  Abdomen: Soft, NT/ND + BS, no masses, no suprapubic tenderness, no hepatomegaly.  Extremities:  2+ DP and radial pulses bilaterally.  No c/c/e.  Skin:  Warm and dry.    Neurologic: Alert & oriented x 3, CN II-XII grossly intact, " strength and sensation grossly intact.  No focal deficits noted.  Psychiatric:  Affect normal, mood normal, judgment normal.    Assessment/Plan:     1. Vertigo  Currently stable but will refer to vestibular therapy and she can keep some meclizine in hand in case of recurrent episodes  - Referral to Physical Therapy    2. Primary hypertension  Doing well on her exercise but will need losartan 50 mg once a day to help with better blood pressure control.  Discussed about her blood pressure log and compliance along with low-salt  - losartan (COZAAR) 50 MG Tab; Take 1 Tablet by mouth every day.  Dispense: 30 Tablet; Refill: 3    3. Dyslipidemia  The 10-year ASCVD risk score (Baldomero MCKINNON, et al., 2019) is: 6.6% patient qualifies for statin but she has been working on her diet and exercise and want to recheck lipid before considering statin next visit only if lipids are not improved  - Lipid Profile; Future    4. Need for vaccination  Flu shot today  - INFLUENZA VACCINE TRI INJ (PF)       All imaging results and lab results and consult notes are reviewed at this visit.  Followup: Return in about 2 months (around 1/6/2025).    Please note that this dictation was created using voice recognition software. I have made every reasonable attempt to correct obvious errors, but I expect that there are errors of grammar and possibly content that I did not discover before finalizing the note.

## 2025-03-06 DIAGNOSIS — I10 PRIMARY HYPERTENSION: ICD-10-CM

## 2025-03-06 RX ORDER — LOSARTAN POTASSIUM 50 MG/1
50 TABLET ORAL DAILY
Qty: 100 TABLET | Refills: 1 | Status: SHIPPED | OUTPATIENT
Start: 2025-03-06

## 2025-03-06 NOTE — TELEPHONE ENCOUNTER
Received request via: Pharmacy    Was the patient seen in the last year in this department? Yes    Does the patient have an active prescription (recently filled or refills available) for medication(s) requested? No    Pharmacy Name:   To be filled at: [facility]/pharmacy #[id number] - [place], [place] - [address] AT [address]          Does the patient have retirement Plus and need 100-day supply? (This applies to ALL medications) Patient does not have SCP

## 2025-03-11 ENCOUNTER — TELEPHONE (OUTPATIENT)
Dept: INTERNAL MEDICINE | Facility: OTHER | Age: 60
End: 2025-03-11
Payer: COMMERCIAL

## 2025-03-12 NOTE — TELEPHONE ENCOUNTER
Phone Number Called: [phone]    Call outcome: Spoke to patient regarding message below.    Message: confirmed patient's address, and mailed out the lab order.   Closing encounter.

## 2025-03-12 NOTE — TELEPHONE ENCOUNTER
Received a voicemail from Valeria, requesting that we mail her the lab that she needs to get done.

## 2025-04-16 ENCOUNTER — HOSPITAL ENCOUNTER (OUTPATIENT)
Dept: LAB | Facility: MEDICAL CENTER | Age: 60
End: 2025-04-16
Attending: INTERNAL MEDICINE
Payer: COMMERCIAL

## 2025-04-16 DIAGNOSIS — E78.5 DYSLIPIDEMIA: ICD-10-CM

## 2025-04-16 LAB
CHOLEST SERPL-MCNC: 276 MG/DL (ref 100–199)
FASTING STATUS PATIENT QL REPORTED: NORMAL
HDLC SERPL-MCNC: 72 MG/DL
LDLC SERPL CALC-MCNC: 191 MG/DL
TRIGL SERPL-MCNC: 67 MG/DL (ref 0–149)

## 2025-04-16 PROCEDURE — 80061 LIPID PANEL: CPT

## 2025-04-16 PROCEDURE — 36415 COLL VENOUS BLD VENIPUNCTURE: CPT

## 2025-04-23 ENCOUNTER — OFFICE VISIT (OUTPATIENT)
Dept: INTERNAL MEDICINE | Facility: OTHER | Age: 60
End: 2025-04-23
Payer: COMMERCIAL

## 2025-04-23 VITALS
SYSTOLIC BLOOD PRESSURE: 122 MMHG | WEIGHT: 139 LBS | BODY MASS INDEX: 28.02 KG/M2 | TEMPERATURE: 96.9 F | HEART RATE: 61 BPM | OXYGEN SATURATION: 98 % | DIASTOLIC BLOOD PRESSURE: 67 MMHG | HEIGHT: 59 IN

## 2025-04-23 DIAGNOSIS — R10.32 GROIN PAIN, LEFT: ICD-10-CM

## 2025-04-23 DIAGNOSIS — I10 PRIMARY HYPERTENSION: ICD-10-CM

## 2025-04-23 DIAGNOSIS — E78.5 DYSLIPIDEMIA: ICD-10-CM

## 2025-04-23 DIAGNOSIS — R73.02 GLUCOSE INTOLERANCE (IMPAIRED GLUCOSE TOLERANCE): ICD-10-CM

## 2025-04-23 DIAGNOSIS — R42 VERTIGO: ICD-10-CM

## 2025-04-23 PROBLEM — K63.5 POLYP OF COLON: Status: ACTIVE | Noted: 2024-12-11

## 2025-04-23 RX ORDER — ROSUVASTATIN CALCIUM 5 MG/1
5 TABLET, COATED ORAL EVERY EVENING
Qty: 100 TABLET | Refills: 3 | Status: SHIPPED | OUTPATIENT
Start: 2025-04-23 | End: 2026-05-28

## 2025-04-23 NOTE — PROGRESS NOTES
date  Chief Complaint   Patient presents with    Annual Exam     - when sitting, feels some discomfort in left hip, going down leg.  First started last Friday.     Vertigo     - two episodes of vertigo in the past two weeks        HISTORY OF PRESENT ILLNESS: Patient is a 60 y.o. female established patient who presents today for the following.      1. Groin pain, left  Patient complains of left groin pain for last couple of days and states it has been like a discomfort and not real sharp pain.  Notices when she is moving around sometimes when she is getting up from the chair.  Denies having any injury or falls.  No trouble with constipation no trouble with bowel movements.  No masses or bulges that she could see    2. Vertigo  Complained of vertigo episodes and she has not taken her meclizine.  We discussed about physical therapy but she has not been to therapy or continued any of the exercises she learned    3. Primary hypertension  Taking her blood pressure medication on a regular basis and denies having headaches or dizziness or blurry vision    4. Glucose intolerance (impaired glucose tolerance)  Trying to watch her diet but recently gained weight.  Would like to get some labs done    5. Dyslipidemia  Not interested in statin and wants to continue diet and exercise the best possible.  The job makes her walk 10,000 steps minimum a day.      Past Medical History:   Diagnosis Date    Abnormal vaginal bleeding in postmenopausal patient 10/12/2021    Seen Gyn and no issues      Abnormal vaginal Pap smear     Cervicitis 05/08/2023    Dyslipidemia     Elevated alkaline phosphatase level     Elevated liver enzymes 06/03/2016    TRANSAMINASES, SERUM, ELEVATED       Fibrocystic breast disease     GERD (gastroesophageal reflux disease)     Glucose intolerance (impaired glucose tolerance)     Hair loss     Heart murmur     Pap smear, as part of routine gynecological examination     Preventative health care     Urge  incontinence     Vitamin D deficiency        Patient Active Problem List    Diagnosis Date Noted    Polyp of colon 12/11/2024    Plantar fasciitis, bilateral 06/26/2024    Glucose intolerance (impaired glucose tolerance) 06/26/2024    Intramural leiomyoma of uterus 05/08/2023    Complex renal cyst 03/27/2023    Vertigo 12/12/2022    Primary hypertension 10/09/2019    Vitamin D deficiency 09/20/2017    Dyslipidemia 09/20/2017    Cracked nails 09/20/2017       Allergies:Patient has no known allergies.    Current Outpatient Medications   Medication Sig Dispense Refill    rosuvastatin (CRESTOR) 5 MG Tab Take 1 Tablet by mouth every evening. 100 Tablet 3    losartan (COZAAR) 50 MG Tab TAKE 1 TABLET BY MOUTH EVERY  Tablet 1    meclizine (ANTIVERT) 25 MG Tab Take 25 mg by mouth 3 times a day as needed. (Patient not taking: Reported on 4/23/2025)      cyanocobalamin (VITAMIN B-12) 100 MCG Tab Take 100 mcg by mouth every day. (Patient not taking: Reported on 4/23/2025)      Calcium Carbonate (CALCIUM 500 PO) Take  by mouth. (Patient not taking: Reported on 4/23/2025)      vitamin D (CHOLECALCIFEROL) 1000 UNIT Tab Take 1,000 Units by mouth every day. (Patient not taking: Reported on 4/23/2025)       No current facility-administered medications for this visit.       Social History     Tobacco Use    Smoking status: Never     Passive exposure: Yes    Smokeless tobacco: Never   Vaping Use    Vaping status: Never Used   Substance Use Topics    Alcohol use: No    Drug use: No       Family History   Problem Relation Age of Onset    Hypertension Mother     Hypertension Father     Cancer Paternal Aunt         Breast CA         Review of Systems   Patient denies Fevers/chills/nausea/vomiting/chest pain/sob/blood in stools/black stools/blood in urine.all other systems are reviewed See HPI    Exam:  /67 (BP Location: Left arm, Patient Position: Sitting, BP Cuff Size: Adult)   Pulse 61   Temp 36.1 °C (96.9 °F) (Temporal)   " Ht 1.499 m (4' 11\")   Wt 63 kg (139 lb)   SpO2 98%  Body mass index is 28.07 kg/m².  Constitutional:  NAD, well appearing.  HEENT:   NC/AT  Cardiovascular: RRR.   No m/r/g. No carotid bruits.       Lungs:   CTAB, no w/r/r, no respiratory distress.  Abdomen: Soft, NT/ND + BS, no masses, no suprapubic tenderness, no hepatomegaly.  Extremities:  2+ DP and radial pulses bilaterally.  No c/c/e.  Range of motion good in her hip.  No visible palpable masses in the groin area  Skin:  Warm and dry.    Neurologic: Alert & oriented x 3, CN II-XII grossly intact, strength and sensation grossly intact.  No focal deficits noted.  Psychiatric:  Affect normal, mood normal, judgment normal.    Assessment/Plan:     1. Groin pain, left  Differential includes hernia versus arthritis hip.  Will start with ultrasound to evaluate for any underlying hernia and then recommend physical therapy if that comes negative to help with the groin pain.  Discussed about Tylenol as needed if it is bad  - US-INGUINAL HERNIA; Future    2. Vertigo  Discussed about continuing home vestibular therapy that once she has learned from her PT class.  Meclizine okay to take as needed and she does have some leftover pills    3. Primary hypertension  Blood pressure stable, discussed about low-salt diet and exercise .  losartan 50 mg once a day. continue to  - Comp Metabolic Panel; Future    4. Glucose intolerance (impaired glucose tolerance)  prior A1c abnormal,  so check labs again   - HEMOGLOBIN A1C; Future  - Comp Metabolic Panel; Future    5. Dyslipidemia   diet and exercise and reassess patient, start statin as The 10-year ASCVD risk score (Baldomero DK, et al., 2019) is: 5.3%  - rosuvastatin (CRESTOR) 5 MG Tab; Take 1 Tablet by mouth every evening.  Dispense: 100 Tablet; Refill: 3  - Lipid Profile; Future  - Comp Metabolic Panel; Future      All imaging results and lab results and consult notes are reviewed at this visit.  Followup: Return in about 4 months " (around 8/23/2025).    Please note that this dictation was created using voice recognition software. I have made every reasonable attempt to correct obvious errors, but I expect that there are errors of grammar and possibly content that I did not discover before finalizing the note.

## 2025-07-16 ENCOUNTER — OFFICE VISIT (OUTPATIENT)
Dept: INTERNAL MEDICINE | Facility: OTHER | Age: 60
End: 2025-07-16
Payer: COMMERCIAL

## 2025-07-16 VITALS
DIASTOLIC BLOOD PRESSURE: 70 MMHG | HEIGHT: 59 IN | OXYGEN SATURATION: 98 % | BODY MASS INDEX: 28.51 KG/M2 | SYSTOLIC BLOOD PRESSURE: 144 MMHG | TEMPERATURE: 97.7 F | WEIGHT: 141.4 LBS | HEART RATE: 60 BPM

## 2025-07-16 DIAGNOSIS — Z23 NEED FOR PNEUMOCOCCAL 20-VALENT CONJUGATE VACCINATION: ICD-10-CM

## 2025-07-16 DIAGNOSIS — Z12.31 ENCOUNTER FOR SCREENING MAMMOGRAM FOR MALIGNANT NEOPLASM OF BREAST: ICD-10-CM

## 2025-07-16 DIAGNOSIS — H93.13 TINNITUS OF BOTH EARS: Primary | ICD-10-CM

## 2025-07-16 DIAGNOSIS — I10 PRIMARY HYPERTENSION: ICD-10-CM

## 2025-07-16 DIAGNOSIS — E78.5 DYSLIPIDEMIA: ICD-10-CM

## 2025-07-16 PROCEDURE — 90677 PCV20 VACCINE IM: CPT | Performed by: INTERNAL MEDICINE

## 2025-07-16 PROCEDURE — 3078F DIAST BP <80 MM HG: CPT | Performed by: INTERNAL MEDICINE

## 2025-07-16 PROCEDURE — 99214 OFFICE O/P EST MOD 30 MIN: CPT | Mod: 25 | Performed by: INTERNAL MEDICINE

## 2025-07-16 PROCEDURE — 3077F SYST BP >= 140 MM HG: CPT | Performed by: INTERNAL MEDICINE

## 2025-07-16 PROCEDURE — 90471 IMMUNIZATION ADMIN: CPT | Performed by: INTERNAL MEDICINE

## 2025-07-16 ASSESSMENT — PATIENT HEALTH QUESTIONNAIRE - PHQ9: CLINICAL INTERPRETATION OF PHQ2 SCORE: 0

## 2025-07-16 NOTE — PATIENT INSTRUCTIONS
Pls take both BP and cholesterol meds daily.  See ENT doctor for Ears    Get Shingles vaccine from pharmacy

## 2025-07-16 NOTE — PROGRESS NOTES
date  Chief Complaint   Patient presents with    Ringing in Ear     Buzzing noise in right ear last night and today in the left ear. No pain. Patient has vertigo        HISTORY OF PRESENT ILLNESS: Patient is a 60 y.o. female established patient who presents today for the following.      1. Tinnitus of both ears  Patient started having buzzing noise in her right ear last night and today in the left ear.  Feels like in general sound like an airplane she states.  Had history of vertigo but currently no symptoms of vertigo.  No issues with deafness or hearing loss.  Denies having any ear infection fever chills    2. Primary hypertension  Takes her blood pressure medication losartan 4-5 times a week and often misses 2-3 times.  Denies having any headaches or dizziness    3. Dyslipidemia  Has not started her statin yet because she is not very keen on more medication.   in the room today reminding her the need for her to take her medications    4. Need for pneumococcal 20-valent conjugate vaccination  Patient due for pneumonia vaccine and agreed to getting that today in the clinic    5. Encounter for screening mammogram for malignant neoplasm of breast  Due for mammogram and has not yet scheduled for this year      Past Medical History[1]    Patient Active Problem List    Diagnosis Date Noted    Tinnitus of both ears 07/16/2025    Polyp of colon 12/11/2024    Plantar fasciitis, bilateral 06/26/2024    Glucose intolerance (impaired glucose tolerance) 06/26/2024    Intramural leiomyoma of uterus 05/08/2023    Complex renal cyst 03/27/2023    Vertigo 12/12/2022    Primary hypertension 10/09/2019    Vitamin D deficiency 09/20/2017    Dyslipidemia 09/20/2017    Cracked nails 09/20/2017       Allergies:Patient has no known allergies.    Current Medications[2]    Social History[3]    Family History   Problem Relation Age of Onset    Hypertension Mother     Hypertension Father     Cancer Paternal Aunt         Breast CA  "        Review of Systems   Patient denies Fevers/chills/nausea/vomiting/chest pain/sob/blood in stools/black stools/blood in urine.all other systems are reviewed See HPI    Exam:  BP (!) 144/70 (BP Location: Left arm, Patient Position: Sitting, BP Cuff Size: Adult)   Pulse 60   Temp 36.5 °C (97.7 °F) (Temporal)   Ht 1.499 m (4' 11\")   Wt 64.1 kg (141 lb 6.4 oz)   SpO2 98%  Body mass index is 28.56 kg/m².  Constitutional:  NAD, well appearing.  HEENT:   NC/AT.  Tympanic membrane bilateral ears within normal.  No cerumen.  Cardiovascular: RRR.   No m/r/g. No carotid bruits.       Lungs:   CTAB, no w/r/r, no respiratory distress.  Abdomen: Soft, NT/ND + BS, no masses, no suprapubic tenderness, no hepatomegaly.  Extremities:  2+ DP and radial pulses bilaterally.  No c/c/e.  Skin:  Warm and dry.    Neurologic: Alert & oriented x 3, CN II-XII grossly intact, strength and sensation grossly intact.  No focal deficits noted.  Psychiatric:  Affect normal, mood normal, judgment normal.    Assessment/Plan:     1. Tinnitus of both ears (Primary)  Given patient's symptoms we discussed about referral to ENT for further evaluation  - Referral to ENT    2. Primary hypertension  Blood pressure advised to take her medication on regular basis as it is running high today in the office.  Low-salt diet and exercise to be continued    3. Dyslipidemia  Educated the need for statin given her risk for heart attack and stroke.   in the room also reiterated the same that she should be taking her medication daily    4. Need for pneumococcal 20-valent conjugate vaccination  Pneumonia vaccine given today  - Pneumococcal Conjugate Vaccine 20-Valent (6 wks+)    5. Encounter for screening mammogram for malignant neoplasm of breast  Mammo order given  - MA-SCREENING MAMMO BILAT W/TOMOSYNTHESIS W/CAD; Future      All imaging results and lab results and consult notes are reviewed at this visit.  Followup: Return in about 3 months (around " 10/16/2025).    Please note that this dictation was created using voice recognition software. I have made every reasonable attempt to correct obvious errors, but I expect that there are errors of grammar and possibly content that I did not discover before finalizing the note.         [1]   Past Medical History:  Diagnosis Date    Abnormal vaginal bleeding in postmenopausal patient 10/12/2021    Seen Gyn and no issues      Abnormal vaginal Pap smear     Cervicitis 05/08/2023    Dyslipidemia     Elevated alkaline phosphatase level     Elevated liver enzymes 06/03/2016    TRANSAMINASES, SERUM, ELEVATED       Fibrocystic breast disease     GERD (gastroesophageal reflux disease)     Glucose intolerance (impaired glucose tolerance)     Hair loss     Heart murmur     Pap smear, as part of routine gynecological examination     Preventative health care     Urge incontinence     Vitamin D deficiency    [2]   Current Outpatient Medications   Medication Sig Dispense Refill    rosuvastatin (CRESTOR) 5 MG Tab Take 1 Tablet by mouth every evening. 100 Tablet 3    losartan (COZAAR) 50 MG Tab TAKE 1 TABLET BY MOUTH EVERY  Tablet 1     No current facility-administered medications for this visit.   [3]   Social History  Tobacco Use    Smoking status: Never     Passive exposure: Yes    Smokeless tobacco: Never   Vaping Use    Vaping status: Never Used   Substance Use Topics    Alcohol use: No    Drug use: No

## 2025-07-21 NOTE — Clinical Note
REFERRAL APPROVAL NOTICE         Sent on July 21, 2025                   Valeria Feliciano  [address] [place] [address]                   Dear Ms. Feliciano,    After a careful review of the medical information and benefit coverage, [facility] has processed your referral. See below for additional details.    If applicable, you must be actively enrolled with your insurance for coverage of the authorized service. If you have any questions regarding your coverage, please contact your insurance directly.    REFERRAL INFORMATION   Referral #:  [id number]  Referred-To Provider    Referred-By Provider:  Otolaryngology    FREDDIE Dugan ANDREW      [address] [address]  [phone] [address] [facility] [facility] [address]  [phone]    Referral Start Date:  07/16/2025  Referral End Date:   07/16/2026             SCHEDULING  If you do not already have an appointment, please call [phone] to make an appointment.     MORE INFORMATION  If you do not already have a [employer] account, sign up at: [email or web address]  You can access your medical information, make appointments, see lab results, billing information, and more.  If you have questions regarding this referral, please contact  the [facility] Referrals department at:             [phone]. Monday - Friday 8:00AM - 5:00PM.     Sincerely,    [facility]

## 2025-08-27 ENCOUNTER — APPOINTMENT (OUTPATIENT)
Dept: RADIOLOGY | Facility: MEDICAL CENTER | Age: 60
End: 2025-08-27
Attending: INTERNAL MEDICINE
Payer: COMMERCIAL